# Patient Record
Sex: FEMALE | Race: WHITE | Employment: OTHER | URBAN - METROPOLITAN AREA
[De-identification: names, ages, dates, MRNs, and addresses within clinical notes are randomized per-mention and may not be internally consistent; named-entity substitution may affect disease eponyms.]

---

## 2020-01-01 ENCOUNTER — OFFICE VISIT (OUTPATIENT)
Dept: GYNECOLOGY | Age: 85
End: 2020-01-01
Payer: MEDICARE

## 2020-01-01 VITALS
HEART RATE: 54 BPM | WEIGHT: 108 LBS | BODY MASS INDEX: 21.2 KG/M2 | SYSTOLIC BLOOD PRESSURE: 160 MMHG | DIASTOLIC BLOOD PRESSURE: 94 MMHG | HEIGHT: 60 IN

## 2020-01-01 DIAGNOSIS — N95.0 POSTMENOPAUSAL BLEEDING: Primary | ICD-10-CM

## 2020-01-01 DIAGNOSIS — R93.89 THICKENED ENDOMETRIUM: ICD-10-CM

## 2020-01-01 PROCEDURE — 99204 OFFICE O/P NEW MOD 45 MIN: CPT | Performed by: OBSTETRICS & GYNECOLOGY

## 2020-01-01 RX ORDER — ATORVASTATIN CALCIUM 10 MG/1
10 TABLET, FILM COATED ORAL
COMMUNITY

## 2020-01-01 RX ORDER — ACETAMINOPHEN 325 MG/1
650 TABLET ORAL
COMMUNITY

## 2020-01-01 RX ORDER — DOCUSATE SODIUM 100 MG/1
200 CAPSULE, LIQUID FILLED ORAL
COMMUNITY

## 2020-01-01 RX ORDER — FOLIC ACID 1 MG/1
1 TABLET ORAL DAILY
COMMUNITY

## 2020-01-01 RX ORDER — ALLOPURINOL 100 MG/1
100 TABLET ORAL DAILY
COMMUNITY

## 2020-01-01 RX ORDER — CYANOCOBALAMIN 1000 UG/ML
1000 INJECTION, SOLUTION INTRAMUSCULAR; SUBCUTANEOUS
COMMUNITY
Start: 2020-01-01

## 2020-01-01 RX ORDER — PANTOPRAZOLE SODIUM 20 MG/1
20 TABLET, DELAYED RELEASE ORAL
COMMUNITY

## 2020-01-01 RX ORDER — CETIRIZINE HCL 10 MG
10 TABLET ORAL DAILY
COMMUNITY

## 2020-01-01 RX ORDER — CHOLECALCIFEROL TAB 125 MCG (5000 UNIT) 125 MCG
5000 TAB ORAL
COMMUNITY
End: 2021-01-01 | Stop reason: DRUGHIGH

## 2020-01-01 RX ORDER — FUROSEMIDE 20 MG/1
20 TABLET ORAL DAILY
COMMUNITY

## 2020-10-15 NOTE — PROGRESS NOTES
[address], [address] [place] [id number] [facility] 
P [phone]  F [phone] Office Note Patient ID: 
Name:  Troy Farrar MRN:  [id number] :   y.o. Date:  10/15/2020 HISTORY OF PRESENT ILLNESS: 
Troy Farrar is a 80 y.o.  postmenopausal female who is being seen for postmenopausal bleeding. She is referred by Dr. Arya Willard in [facility]. A pelvic ultrasound demonstrates a 22 mm endometrial stripe. The uterus was small and otherwise normal.  The ovaries were not visualized. She has had prior endometrial biopsies which have been negative. The last one was in 2019. She also has a history of lichen sclerosus of the vulva. I have been asked to see her in consultation for further evaluation and management. She is a bit confused and is a very poor historian. She has a friend with her from which most of the history was obtained. The bleeding is minimal.    
  
 
ROS: 
 and GI review:  Negative Cardiopulmonary review:  Negative Musculoskeletal:  Negative A comprehensive review of systems was negative except for that written in the History of Present Illness. , 10 point ROS 
 
 
OB/GYN ROS: 
 Patient denies any abnormal bleeding or vaginal discharge. Problem List: 
There are no active problems to display for this patient. PMH: 
Past Medical History:  
Diagnosis Date  
 HTN (hypertension)  Hyperlipidemia PSH: 
History reviewed. No pertinent surgical history. Social History: 
Social History Tobacco Use  Smoking status: Never Smoker  Smokeless tobacco: Never Used Substance Use Topics  Alcohol use: Not on file Family History: 
History reviewed. No pertinent family history. Medications: (reviewed) Current Outpatient Medications Medication Sig  
 allopurinoL (ZYLOPRIM) 100 mg tablet Take 100 mg by mouth daily.  atorvastatin (LIPITOR) 10 mg tablet Take 10 mg by mouth daily.  cetirizine (ZYRTEC) 10 mg tablet Take 10 mg by mouth daily.  cholecalciferol (VITAMIN D3) (5000 Units/125 mcg) tab tablet Take 5,000 Units by mouth.  cyanocobalamin (VITAMIN B12) 1,000 mcg/mL injection  folic acid (FOLVITE) 1 mg tablet Take 1 mg by mouth daily.  furosemide (LASIX) 20 mg tablet Take 20 mg by mouth two (2) times a day.  pantoprazole (PROTONIX) 20 mg tablet Take 20 mg by mouth.  acetaminophen (TYLENOL) 325 mg tablet Take  by mouth every four (4) hours as needed for Pain.  docusate sodium (COLACE) 100 mg capsule Take 100 mg by mouth two (2) times a day. No current facility-administered medications for this visit. Allergies: (reviewed) Allergies Allergen Reactions  Nsaids (Non-Steroidal Anti-Inflammatory Drug) Unknown (comments)  Penicillins Unknown (comments)  Sulfa (Sulfonamide Antibiotics) Unknown (comments)  Sulfasalazine Unknown (comments) OBJECTIVE: 
 
Physical Exam: VITAL SIGNS: Vitals:  
 10/15/20 1458 BP: (!) 160/94 Pulse: (!) 54 Weight: 108 lb (49 kg) Height: 5' (1.524 m) Body mass index is 21.09 kg/m². GENERAL JUSTINA: Conversant, alert, oriented. No acute distress. HEENT: HEENT. No thyroid enlargement. No JVD. Neck: Supple without restrictions. RESPIRATORY: Clear to auscultation and percussion to the bases. No CVAT. CARDIOVASC: RRR without murmur/rub. GASTROINT: soft, non-tender; large ventral hernia MUSCULOSKEL: no joint tenderness, deformity or swelling EXTREMITIES: extremities normal, atraumatic, no cyanosis or edema PELVIC: Deferred RECTAL: Deferred YADIRA SURVEY: No suspicious lymphadenopathy or edema noted. NEURO: Grossly intact. No acute deficit. Lab Data: 
 
No results found for: WBC, HGB, HCT, PLT, MCV, HGBEXT, HCTEXT, PLTEXT, HGBEXT, HCTEXT, PLTEXT No results found for: NA, K, CL, CO2, AGAP, GLU, BUN, CREA, BUCR, GFRAA, [address] [place] [address] Imaging: Outside pelvic ultrasound from Dr. Luke Gaitan office reviewed. Pertinent findings noted in HPI. IMPRESSION/PLAN: 
Ranjan Hung is a 80 y.o. female with a working diagnosis of postmenopausal bleeding and a thickened endometrium. I reviewed with Ranjan Hung her medical records, physical exam, and review of symptoms. I recommended a hysteroscopy/D&C to evaluate. This would adequately evaluate for the presence of malignancy and if the symptoms are due to a polyp it could be therapeutic. She was counseled on the risks, benefits, indications, and alternatives of surgery. Her questions were answered and she wishes to proceed.  
 
 
 
Signed By: Shannan Medina MD   
 10/15/2020/12:10 PM

## 2021-01-01 ENCOUNTER — HOSPITAL ENCOUNTER (INPATIENT)
Age: 86
LOS: 3 days | DRG: 951 | End: 2021-01-28
Attending: FAMILY MEDICINE | Admitting: FAMILY MEDICINE
Payer: OTHER MISCELLANEOUS

## 2021-01-01 ENCOUNTER — APPOINTMENT (OUTPATIENT)
Dept: CT IMAGING | Age: 86
DRG: 871 | End: 2021-01-01
Attending: STUDENT IN AN ORGANIZED HEALTH CARE EDUCATION/TRAINING PROGRAM
Payer: MEDICARE

## 2021-01-01 ENCOUNTER — HOSPICE ADMISSION (OUTPATIENT)
Dept: HOSPICE | Facility: HOSPICE | Age: 86
End: 2021-01-01
Payer: MEDICARE

## 2021-01-01 ENCOUNTER — HOSPITAL ENCOUNTER (INPATIENT)
Age: 86
LOS: 6 days | Discharge: HOSPICE | DRG: 871 | End: 2021-01-25
Attending: STUDENT IN AN ORGANIZED HEALTH CARE EDUCATION/TRAINING PROGRAM | Admitting: HOSPITALIST
Payer: MEDICARE

## 2021-01-01 ENCOUNTER — APPOINTMENT (OUTPATIENT)
Dept: GENERAL RADIOLOGY | Age: 86
DRG: 871 | End: 2021-01-01
Attending: STUDENT IN AN ORGANIZED HEALTH CARE EDUCATION/TRAINING PROGRAM
Payer: MEDICARE

## 2021-01-01 VITALS
SYSTOLIC BLOOD PRESSURE: 112 MMHG | HEIGHT: 60 IN | HEART RATE: 58 BPM | OXYGEN SATURATION: 97 % | RESPIRATION RATE: 18 BRPM | WEIGHT: 123.24 LBS | BODY MASS INDEX: 24.19 KG/M2 | TEMPERATURE: 97.1 F | DIASTOLIC BLOOD PRESSURE: 57 MMHG

## 2021-01-01 VITALS
TEMPERATURE: 100.8 F | DIASTOLIC BLOOD PRESSURE: 35 MMHG | SYSTOLIC BLOOD PRESSURE: 57 MMHG | OXYGEN SATURATION: 90 % | HEART RATE: 108 BPM | RESPIRATION RATE: 24 BRPM

## 2021-01-01 DIAGNOSIS — G93.41 METABOLIC ENCEPHALOPATHY: ICD-10-CM

## 2021-01-01 DIAGNOSIS — R45.1 RESTLESSNESS AND AGITATION: ICD-10-CM

## 2021-01-01 DIAGNOSIS — J96.01 ACUTE RESPIRATORY FAILURE WITH HYPOXIA (HCC): ICD-10-CM

## 2021-01-01 DIAGNOSIS — U07.1 PNEUMONIA DUE TO COVID-19 VIRUS: ICD-10-CM

## 2021-01-01 DIAGNOSIS — A41.9 SEPSIS WITHOUT ACUTE ORGAN DYSFUNCTION, DUE TO UNSPECIFIED ORGANISM (HCC): ICD-10-CM

## 2021-01-01 DIAGNOSIS — R13.12 OROPHARYNGEAL DYSPHAGIA: ICD-10-CM

## 2021-01-01 DIAGNOSIS — J12.82 PNEUMONIA DUE TO COVID-19 VIRUS: ICD-10-CM

## 2021-01-01 DIAGNOSIS — Z51.5 HOSPICE CARE PATIENT: ICD-10-CM

## 2021-01-01 DIAGNOSIS — R41.82 ALTERED MENTAL STATUS, UNSPECIFIED ALTERED MENTAL STATUS TYPE: ICD-10-CM

## 2021-01-01 DIAGNOSIS — J12.82 PNEUMONIA DUE TO COVID-19 VIRUS: Primary | ICD-10-CM

## 2021-01-01 DIAGNOSIS — U07.1 PNEUMONIA DUE TO COVID-19 VIRUS: Primary | ICD-10-CM

## 2021-01-01 DIAGNOSIS — E87.6 HYPOKALEMIA: ICD-10-CM

## 2021-01-01 DIAGNOSIS — Z71.89 COUNSELING REGARDING ADVANCE CARE PLANNING AND GOALS OF CARE: ICD-10-CM

## 2021-01-01 DIAGNOSIS — R06.02 SHORTNESS OF BREATH: ICD-10-CM

## 2021-01-01 LAB
ALBUMIN SERPL-MCNC: 2.2 G/DL (ref 3.5–5)
ALBUMIN SERPL-MCNC: 2.4 G/DL (ref 3.5–5)
ALBUMIN SERPL-MCNC: 2.4 G/DL (ref 3.5–5)
ALBUMIN SERPL-MCNC: 2.5 G/DL (ref 3.5–5)
ALBUMIN SERPL-MCNC: 2.7 G/DL (ref 3.5–5)
ALBUMIN/GLOB SERPL: 0.6 {RATIO} (ref 1.1–2.2)
ALBUMIN/GLOB SERPL: 0.7 {RATIO} (ref 1.1–2.2)
ALP SERPL-CCNC: 102 U/L (ref 45–117)
ALP SERPL-CCNC: 104 U/L (ref 45–117)
ALP SERPL-CCNC: 109 U/L (ref 45–117)
ALP SERPL-CCNC: 121 U/L (ref 45–117)
ALP SERPL-CCNC: 94 U/L (ref 45–117)
ALT SERPL-CCNC: 13 U/L (ref 12–78)
ALT SERPL-CCNC: 15 U/L (ref 12–78)
ALT SERPL-CCNC: 15 U/L (ref 12–78)
ALT SERPL-CCNC: 18 U/L (ref 12–78)
ALT SERPL-CCNC: 26 U/L (ref 12–78)
ANION GAP SERPL CALC-SCNC: 5 MMOL/L (ref 5–15)
ANION GAP SERPL CALC-SCNC: 6 MMOL/L (ref 5–15)
ANION GAP SERPL CALC-SCNC: 7 MMOL/L (ref 5–15)
ANION GAP SERPL CALC-SCNC: 7 MMOL/L (ref 5–15)
APPEARANCE UR: CLEAR
ARTERIAL PATENCY WRIST A: ABNORMAL
AST SERPL-CCNC: 27 U/L (ref 15–37)
AST SERPL-CCNC: 29 U/L (ref 15–37)
AST SERPL-CCNC: 30 U/L (ref 15–37)
AST SERPL-CCNC: 33 U/L (ref 15–37)
AST SERPL-CCNC: 44 U/L (ref 15–37)
ATRIAL RATE: 85 BPM
BACTERIA SPEC CULT: ABNORMAL
BACTERIA SPEC CULT: ABNORMAL
BACTERIA SPEC CULT: NORMAL
BACTERIA URNS QL MICRO: NEGATIVE /HPF
BASE EXCESS BLD CALC-SCNC: 2 MMOL/L
BASOPHILS # BLD: 0 K/UL (ref 0–0.1)
BASOPHILS NFR BLD: 0 % (ref 0–1)
BDY SITE: ABNORMAL
BILIRUB SERPL-MCNC: 0.6 MG/DL (ref 0.2–1)
BILIRUB SERPL-MCNC: 0.7 MG/DL (ref 0.2–1)
BILIRUB SERPL-MCNC: 0.7 MG/DL (ref 0.2–1)
BILIRUB SERPL-MCNC: 0.8 MG/DL (ref 0.2–1)
BILIRUB SERPL-MCNC: 0.8 MG/DL (ref 0.2–1)
BILIRUB UR QL: NEGATIVE
BUN SERPL-MCNC: 24 MG/DL (ref 6–20)
BUN SERPL-MCNC: 25 MG/DL (ref 6–20)
BUN SERPL-MCNC: 25 MG/DL (ref 6–20)
BUN SERPL-MCNC: 26 MG/DL (ref 6–20)
BUN SERPL-MCNC: 27 MG/DL (ref 6–20)
BUN SERPL-MCNC: 28 MG/DL (ref 6–20)
BUN SERPL-MCNC: 31 MG/DL (ref 6–20)
BUN SERPL-MCNC: 36 MG/DL (ref 6–20)
BUN/CREAT SERPL: 19 (ref 12–20)
BUN/CREAT SERPL: 20 (ref 12–20)
BUN/CREAT SERPL: 21 (ref 12–20)
BUN/CREAT SERPL: 22 (ref 12–20)
BUN/CREAT SERPL: 23 (ref 12–20)
BUN/CREAT SERPL: 28 (ref 12–20)
BUN/CREAT SERPL: 30 (ref 12–20)
BUN/CREAT SERPL: 32 (ref 12–20)
CA-I BLD-SCNC: 1.29 MMOL/L (ref 1.12–1.32)
CALCIUM SERPL-MCNC: 9 MG/DL (ref 8.5–10.1)
CALCIUM SERPL-MCNC: 9.1 MG/DL (ref 8.5–10.1)
CALCIUM SERPL-MCNC: 9.2 MG/DL (ref 8.5–10.1)
CALCIUM SERPL-MCNC: 9.2 MG/DL (ref 8.5–10.1)
CALCIUM SERPL-MCNC: 9.3 MG/DL (ref 8.5–10.1)
CALCIUM SERPL-MCNC: 9.6 MG/DL (ref 8.5–10.1)
CALCULATED P AXIS, ECG09: 52 DEGREES
CALCULATED R AXIS, ECG10: -12 DEGREES
CALCULATED R AXIS, ECG10: -21 DEGREES
CALCULATED R AXIS, ECG10: -60 DEGREES
CALCULATED T AXIS, ECG11: 104 DEGREES
CALCULATED T AXIS, ECG11: 117 DEGREES
CALCULATED T AXIS, ECG11: 89 DEGREES
CHLORIDE SERPL-SCNC: 110 MMOL/L (ref 97–108)
CHLORIDE SERPL-SCNC: 111 MMOL/L (ref 97–108)
CHLORIDE SERPL-SCNC: 112 MMOL/L (ref 97–108)
CHLORIDE SERPL-SCNC: 113 MMOL/L (ref 97–108)
CHLORIDE SERPL-SCNC: 115 MMOL/L (ref 97–108)
CHLORIDE SERPL-SCNC: 117 MMOL/L (ref 97–108)
CHLORIDE SERPL-SCNC: 118 MMOL/L (ref 97–108)
CHLORIDE SERPL-SCNC: 121 MMOL/L (ref 97–108)
CO2 SERPL-SCNC: 18 MMOL/L (ref 21–32)
CO2 SERPL-SCNC: 18 MMOL/L (ref 21–32)
CO2 SERPL-SCNC: 19 MMOL/L (ref 21–32)
CO2 SERPL-SCNC: 20 MMOL/L (ref 21–32)
CO2 SERPL-SCNC: 24 MMOL/L (ref 21–32)
CO2 SERPL-SCNC: 26 MMOL/L (ref 21–32)
CO2 SERPL-SCNC: 28 MMOL/L (ref 21–32)
CO2 SERPL-SCNC: 31 MMOL/L (ref 21–32)
COLOR UR: ABNORMAL
COMMENT, HOLDF: NORMAL
COVID-19 RAPID TEST, COVR: DETECTED
CREAT SERPL-MCNC: 0.98 MG/DL (ref 0.55–1.02)
CREAT SERPL-MCNC: 1 MG/DL (ref 0.55–1.02)
CREAT SERPL-MCNC: 1.15 MG/DL (ref 0.55–1.02)
CREAT SERPL-MCNC: 1.19 MG/DL (ref 0.55–1.02)
CREAT SERPL-MCNC: 1.21 MG/DL (ref 0.55–1.02)
CREAT SERPL-MCNC: 1.21 MG/DL (ref 0.55–1.02)
CREAT SERPL-MCNC: 1.22 MG/DL (ref 0.55–1.02)
CREAT SERPL-MCNC: 1.34 MG/DL (ref 0.55–1.02)
CRP SERPL-MCNC: 2.88 MG/DL (ref 0–0.6)
D DIMER PPP FEU-MCNC: 11.24 MG/L FEU (ref 0–0.65)
D DIMER PPP FEU-MCNC: 4.1 MG/L FEU (ref 0–0.65)
D DIMER PPP FEU-MCNC: 4.4 MG/L FEU (ref 0–0.65)
D DIMER PPP FEU-MCNC: 8.09 MG/L FEU (ref 0–0.65)
D DIMER PPP FEU-MCNC: 9.88 MG/L FEU (ref 0–0.65)
DATE LAST DOSE: ABNORMAL
DIAGNOSIS, 93000: NORMAL
DIFFERENTIAL METHOD BLD: ABNORMAL
EOSINOPHIL # BLD: 0 K/UL (ref 0–0.4)
EOSINOPHIL NFR BLD: 0 % (ref 0–7)
EPITH CASTS URNS QL MICRO: ABNORMAL /LPF
ERYTHROCYTE [DISTWIDTH] IN BLOOD BY AUTOMATED COUNT: 13.2 % (ref 11.5–14.5)
ERYTHROCYTE [DISTWIDTH] IN BLOOD BY AUTOMATED COUNT: 13.2 % (ref 11.5–14.5)
ERYTHROCYTE [DISTWIDTH] IN BLOOD BY AUTOMATED COUNT: 13.4 % (ref 11.5–14.5)
ERYTHROCYTE [DISTWIDTH] IN BLOOD BY AUTOMATED COUNT: 13.4 % (ref 11.5–14.5)
ERYTHROCYTE [DISTWIDTH] IN BLOOD BY AUTOMATED COUNT: 13.6 % (ref 11.5–14.5)
ERYTHROCYTE [DISTWIDTH] IN BLOOD BY AUTOMATED COUNT: 14 % (ref 11.5–14.5)
ERYTHROCYTE [DISTWIDTH] IN BLOOD BY AUTOMATED COUNT: 14.4 % (ref 11.5–14.5)
FERRITIN SERPL-MCNC: 122 NG/ML (ref 26–388)
FERRITIN SERPL-MCNC: 147 NG/ML (ref 8–252)
FIBRINOGEN PPP-MCNC: 260 MG/DL (ref 200–475)
FIBRINOGEN PPP-MCNC: 281 MG/DL (ref 200–475)
FIBRINOGEN PPP-MCNC: 291 MG/DL (ref 200–475)
GAS FLOW.O2 O2 DELIVERY SYS: ABNORMAL L/MIN
GLOBULIN SER CALC-MCNC: 3.7 G/DL (ref 2–4)
GLOBULIN SER CALC-MCNC: 3.8 G/DL (ref 2–4)
GLOBULIN SER CALC-MCNC: 3.8 G/DL (ref 2–4)
GLOBULIN SER CALC-MCNC: 3.9 G/DL (ref 2–4)
GLOBULIN SER CALC-MCNC: 4.5 G/DL (ref 2–4)
GLUCOSE BLD STRIP.AUTO-MCNC: 103 MG/DL (ref 65–100)
GLUCOSE BLD STRIP.AUTO-MCNC: 113 MG/DL (ref 65–100)
GLUCOSE BLD STRIP.AUTO-MCNC: 114 MG/DL (ref 65–100)
GLUCOSE BLD STRIP.AUTO-MCNC: 116 MG/DL (ref 65–100)
GLUCOSE BLD STRIP.AUTO-MCNC: 118 MG/DL (ref 65–100)
GLUCOSE BLD STRIP.AUTO-MCNC: 118 MG/DL (ref 65–100)
GLUCOSE BLD STRIP.AUTO-MCNC: 120 MG/DL (ref 65–100)
GLUCOSE BLD STRIP.AUTO-MCNC: 121 MG/DL (ref 65–100)
GLUCOSE BLD STRIP.AUTO-MCNC: 124 MG/DL (ref 65–100)
GLUCOSE BLD STRIP.AUTO-MCNC: 126 MG/DL (ref 65–100)
GLUCOSE BLD STRIP.AUTO-MCNC: 133 MG/DL (ref 65–100)
GLUCOSE BLD STRIP.AUTO-MCNC: 136 MG/DL (ref 65–100)
GLUCOSE BLD STRIP.AUTO-MCNC: 139 MG/DL (ref 65–100)
GLUCOSE BLD STRIP.AUTO-MCNC: 141 MG/DL (ref 65–100)
GLUCOSE BLD STRIP.AUTO-MCNC: 146 MG/DL (ref 65–100)
GLUCOSE BLD STRIP.AUTO-MCNC: 150 MG/DL (ref 65–100)
GLUCOSE BLD STRIP.AUTO-MCNC: 152 MG/DL (ref 65–100)
GLUCOSE BLD STRIP.AUTO-MCNC: 154 MG/DL (ref 65–100)
GLUCOSE BLD STRIP.AUTO-MCNC: 159 MG/DL (ref 65–100)
GLUCOSE BLD STRIP.AUTO-MCNC: 163 MG/DL (ref 65–100)
GLUCOSE BLD STRIP.AUTO-MCNC: 167 MG/DL (ref 65–100)
GLUCOSE BLD STRIP.AUTO-MCNC: 174 MG/DL (ref 65–100)
GLUCOSE BLD STRIP.AUTO-MCNC: 182 MG/DL (ref 65–100)
GLUCOSE BLD STRIP.AUTO-MCNC: 189 MG/DL (ref 65–100)
GLUCOSE BLD STRIP.AUTO-MCNC: 207 MG/DL (ref 65–100)
GLUCOSE BLD STRIP.AUTO-MCNC: 94 MG/DL (ref 65–100)
GLUCOSE SERPL-MCNC: 101 MG/DL (ref 65–100)
GLUCOSE SERPL-MCNC: 118 MG/DL (ref 65–100)
GLUCOSE SERPL-MCNC: 131 MG/DL (ref 65–100)
GLUCOSE SERPL-MCNC: 137 MG/DL (ref 65–100)
GLUCOSE SERPL-MCNC: 138 MG/DL (ref 65–100)
GLUCOSE SERPL-MCNC: 149 MG/DL (ref 65–100)
GLUCOSE SERPL-MCNC: 153 MG/DL (ref 65–100)
GLUCOSE SERPL-MCNC: 177 MG/DL (ref 65–100)
GLUCOSE UR STRIP.AUTO-MCNC: NEGATIVE MG/DL
HCO3 BLD-SCNC: 26.5 MMOL/L (ref 22–26)
HCT VFR BLD AUTO: 36.8 % (ref 35–47)
HCT VFR BLD AUTO: 38.2 % (ref 35–47)
HCT VFR BLD AUTO: 38.4 % (ref 35–47)
HCT VFR BLD AUTO: 40.2 % (ref 35–47)
HCT VFR BLD AUTO: 40.4 % (ref 35–47)
HCT VFR BLD AUTO: 42.2 % (ref 35–47)
HCT VFR BLD AUTO: 45.2 % (ref 35–47)
HEALTH STATUS, XMCV2T: ABNORMAL
HGB BLD-MCNC: 11.6 G/DL (ref 11.5–16)
HGB BLD-MCNC: 11.9 G/DL (ref 11.5–16)
HGB BLD-MCNC: 12 G/DL (ref 11.5–16)
HGB BLD-MCNC: 12.6 G/DL (ref 11.5–16)
HGB BLD-MCNC: 12.8 G/DL (ref 11.5–16)
HGB BLD-MCNC: 13.5 G/DL (ref 11.5–16)
HGB BLD-MCNC: 14.1 G/DL (ref 11.5–16)
HGB UR QL STRIP: ABNORMAL
IMM GRANULOCYTES # BLD AUTO: 0.1 K/UL (ref 0–0.04)
IMM GRANULOCYTES # BLD AUTO: 0.2 K/UL (ref 0–0.04)
IMM GRANULOCYTES NFR BLD AUTO: 1 % (ref 0–0.5)
IMM GRANULOCYTES NFR BLD AUTO: 2 % (ref 0–0.5)
INR PPP: 1.1 (ref 0.9–1.1)
INR PPP: 1.1 (ref 0.9–1.1)
INR PPP: 1.2 (ref 0.9–1.1)
INR PPP: 1.2 (ref 0.9–1.1)
INR PPP: 1.3 (ref 0.9–1.1)
INR PPP: 1.4 (ref 0.9–1.1)
INR PPP: 1.5 (ref 0.9–1.1)
KETONES UR QL STRIP.AUTO: ABNORMAL MG/DL
LACTATE BLD-SCNC: 1.77 MMOL/L (ref 0.4–2)
LDH SERPL L TO P-CCNC: 384 U/L (ref 81–246)
LDH SERPL L TO P-CCNC: 433 U/L (ref 81–246)
LEUKOCYTE ESTERASE UR QL STRIP.AUTO: NEGATIVE
LYMPHOCYTES # BLD: 0.5 K/UL (ref 0.8–3.5)
LYMPHOCYTES # BLD: 0.6 K/UL (ref 0.8–3.5)
LYMPHOCYTES # BLD: 1 K/UL (ref 0.8–3.5)
LYMPHOCYTES # BLD: 1.5 K/UL (ref 0.8–3.5)
LYMPHOCYTES NFR BLD: 4 % (ref 12–49)
LYMPHOCYTES NFR BLD: 4 % (ref 12–49)
LYMPHOCYTES NFR BLD: 6 % (ref 12–49)
LYMPHOCYTES NFR BLD: 8 % (ref 12–49)
MAGNESIUM SERPL-MCNC: 2 MG/DL (ref 1.6–2.4)
MAGNESIUM SERPL-MCNC: 2 MG/DL (ref 1.6–2.4)
MAGNESIUM SERPL-MCNC: 2.3 MG/DL (ref 1.6–2.4)
MCH RBC QN AUTO: 28 PG (ref 26–34)
MCH RBC QN AUTO: 28.1 PG (ref 26–34)
MCH RBC QN AUTO: 28.4 PG (ref 26–34)
MCH RBC QN AUTO: 28.5 PG (ref 26–34)
MCH RBC QN AUTO: 28.6 PG (ref 26–34)
MCH RBC QN AUTO: 28.7 PG (ref 26–34)
MCH RBC QN AUTO: 29 PG (ref 26–34)
MCHC RBC AUTO-ENTMCNC: 31.2 G/DL (ref 30–36.5)
MCHC RBC AUTO-ENTMCNC: 31.3 G/DL (ref 30–36.5)
MCHC RBC AUTO-ENTMCNC: 31.5 G/DL (ref 30–36.5)
MCHC RBC AUTO-ENTMCNC: 31.8 G/DL (ref 30–36.5)
MCHC RBC AUTO-ENTMCNC: 32 G/DL (ref 30–36.5)
MCV RBC AUTO: 88.9 FL (ref 80–99)
MCV RBC AUTO: 89.8 FL (ref 80–99)
MCV RBC AUTO: 90 FL (ref 80–99)
MCV RBC AUTO: 91 FL (ref 80–99)
MCV RBC AUTO: 91.1 FL (ref 80–99)
MCV RBC AUTO: 91.4 FL (ref 80–99)
MCV RBC AUTO: 91.4 FL (ref 80–99)
MONOCYTES # BLD: 0.6 K/UL (ref 0–1)
MONOCYTES # BLD: 0.7 K/UL (ref 0–1)
MONOCYTES # BLD: 0.9 K/UL (ref 0–1)
MONOCYTES # BLD: 1 K/UL (ref 0–1)
MONOCYTES NFR BLD: 4 % (ref 5–13)
MONOCYTES NFR BLD: 5 % (ref 5–13)
MONOCYTES NFR BLD: 5 % (ref 5–13)
MONOCYTES NFR BLD: 6 % (ref 5–13)
NEUTS SEG # BLD: 12.1 K/UL (ref 1.8–8)
NEUTS SEG # BLD: 12.2 K/UL (ref 1.8–8)
NEUTS SEG # BLD: 13.8 K/UL (ref 1.8–8)
NEUTS SEG # BLD: 15.2 K/UL (ref 1.8–8)
NEUTS SEG NFR BLD: 86 % (ref 32–75)
NEUTS SEG NFR BLD: 87 % (ref 32–75)
NEUTS SEG NFR BLD: 90 % (ref 32–75)
NEUTS SEG NFR BLD: 90 % (ref 32–75)
NITRITE UR QL STRIP.AUTO: NEGATIVE
NRBC # BLD: 0 K/UL (ref 0–0.01)
NRBC BLD-RTO: 0 PER 100 WBC
P-R INTERVAL, ECG05: 136 MS
PCO2 BLD: 39.3 MMHG (ref 35–45)
PH BLD: 7.44 [PH] (ref 7.35–7.45)
PH UR STRIP: 5.5 [PH] (ref 5–8)
PHOSPHATE SERPL-MCNC: 2.2 MG/DL (ref 2.6–4.7)
PLATELET # BLD AUTO: 230 K/UL (ref 150–400)
PLATELET # BLD AUTO: 233 K/UL (ref 150–400)
PLATELET # BLD AUTO: 249 K/UL (ref 150–400)
PLATELET # BLD AUTO: 264 K/UL (ref 150–400)
PLATELET # BLD AUTO: 288 K/UL (ref 150–400)
PLATELET # BLD AUTO: 302 K/UL (ref 150–400)
PLATELET # BLD AUTO: 339 K/UL (ref 150–400)
PMV BLD AUTO: 10.2 FL (ref 8.9–12.9)
PMV BLD AUTO: 10.7 FL (ref 8.9–12.9)
PMV BLD AUTO: 10.8 FL (ref 8.9–12.9)
PMV BLD AUTO: 10.9 FL (ref 8.9–12.9)
PMV BLD AUTO: 10.9 FL (ref 8.9–12.9)
PO2 BLD: 61 MMHG (ref 80–100)
POTASSIUM SERPL-SCNC: 3.1 MMOL/L (ref 3.5–5.1)
POTASSIUM SERPL-SCNC: 3.1 MMOL/L (ref 3.5–5.1)
POTASSIUM SERPL-SCNC: 3.3 MMOL/L (ref 3.5–5.1)
POTASSIUM SERPL-SCNC: 3.5 MMOL/L (ref 3.5–5.1)
POTASSIUM SERPL-SCNC: 4 MMOL/L (ref 3.5–5.1)
POTASSIUM SERPL-SCNC: 4.6 MMOL/L (ref 3.5–5.1)
POTASSIUM SERPL-SCNC: 5.2 MMOL/L (ref 3.5–5.1)
POTASSIUM SERPL-SCNC: 5.2 MMOL/L (ref 3.5–5.1)
PROCALCITONIN SERPL-MCNC: 0.19 NG/ML
PROCALCITONIN SERPL-MCNC: 0.23 NG/ML
PROT SERPL-MCNC: 6 G/DL (ref 6.4–8.2)
PROT SERPL-MCNC: 6.2 G/DL (ref 6.4–8.2)
PROT SERPL-MCNC: 6.2 G/DL (ref 6.4–8.2)
PROT SERPL-MCNC: 6.3 G/DL (ref 6.4–8.2)
PROT SERPL-MCNC: 7.2 G/DL (ref 6.4–8.2)
PROT UR STRIP-MCNC: NEGATIVE MG/DL
PROTHROMBIN TIME: 11.5 SEC (ref 9–11.1)
PROTHROMBIN TIME: 11.6 SEC (ref 9–11.1)
PROTHROMBIN TIME: 12.1 SEC (ref 9–11.1)
PROTHROMBIN TIME: 12.3 SEC (ref 9–11.1)
PROTHROMBIN TIME: 13.3 SEC (ref 9–11.1)
PROTHROMBIN TIME: 14.3 SEC (ref 9–11.1)
PROTHROMBIN TIME: 15.6 SEC (ref 9–11.1)
Q-T INTERVAL, ECG07: 360 MS
Q-T INTERVAL, ECG07: 362 MS
Q-T INTERVAL, ECG07: 370 MS
QRS DURATION, ECG06: 70 MS
QRS DURATION, ECG06: 74 MS
QRS DURATION, ECG06: 90 MS
QTC CALCULATION (BEZET), ECG08: 412 MS
QTC CALCULATION (BEZET), ECG08: 428 MS
QTC CALCULATION (BEZET), ECG08: 429 MS
RBC # BLD AUTO: 4.14 M/UL (ref 3.8–5.2)
RBC # BLD AUTO: 4.18 M/UL (ref 3.8–5.2)
RBC # BLD AUTO: 4.2 M/UL (ref 3.8–5.2)
RBC # BLD AUTO: 4.42 M/UL (ref 3.8–5.2)
RBC # BLD AUTO: 4.49 M/UL (ref 3.8–5.2)
RBC # BLD AUTO: 4.7 M/UL (ref 3.8–5.2)
RBC # BLD AUTO: 4.96 M/UL (ref 3.8–5.2)
RBC #/AREA URNS HPF: ABNORMAL /HPF (ref 0–5)
RBC MORPH BLD: ABNORMAL
REPORTED DOSE,DOSE: ABNORMAL UNITS
REPORTED DOSE/TIME,TMG: ABNORMAL
SAMPLES BEING HELD,HOLD: NORMAL
SAO2 % BLD: 92 % (ref 92–97)
SERVICE CMNT-IMP: ABNORMAL
SERVICE CMNT-IMP: NORMAL
SERVICE CMNT-IMP: NORMAL
SODIUM SERPL-SCNC: 141 MMOL/L (ref 136–145)
SODIUM SERPL-SCNC: 142 MMOL/L (ref 136–145)
SODIUM SERPL-SCNC: 142 MMOL/L (ref 136–145)
SODIUM SERPL-SCNC: 143 MMOL/L (ref 136–145)
SODIUM SERPL-SCNC: 144 MMOL/L (ref 136–145)
SODIUM SERPL-SCNC: 144 MMOL/L (ref 136–145)
SODIUM SERPL-SCNC: 145 MMOL/L (ref 136–145)
SODIUM SERPL-SCNC: 147 MMOL/L (ref 136–145)
SOURCE, COVRS: ABNORMAL
SP GR UR REFRACTOMETRY: 1.01 (ref 1–1.03)
SPECIMEN SOURCE, FCOV2M: ABNORMAL
SPECIMEN TYPE, XMCV1T: ABNORMAL
SPECIMEN TYPE: ABNORMAL
TOTAL RESP. RATE, ITRR: 16
TROPONIN I SERPL-MCNC: <0.05 NG/ML
UA: UC IF INDICATED,UAUC: ABNORMAL
UROBILINOGEN UR QL STRIP.AUTO: 1 EU/DL (ref 0.2–1)
VANCOMYCIN TROUGH SERPL-MCNC: 10.7 UG/ML (ref 5–10)
VENTRICULAR RATE, ECG03: 78 BPM
VENTRICULAR RATE, ECG03: 81 BPM
VENTRICULAR RATE, ECG03: 85 BPM
WBC # BLD AUTO: 11.2 K/UL (ref 3.6–11)
WBC # BLD AUTO: 11.2 K/UL (ref 3.6–11)
WBC # BLD AUTO: 13.4 K/UL (ref 3.6–11)
WBC # BLD AUTO: 13.5 K/UL (ref 3.6–11)
WBC # BLD AUTO: 16 K/UL (ref 3.6–11)
WBC # BLD AUTO: 16.2 K/UL (ref 3.6–11)
WBC # BLD AUTO: 17.7 K/UL (ref 3.6–11)
WBC URNS QL MICRO: ABNORMAL /HPF (ref 0–4)

## 2021-01-01 PROCEDURE — 94760 N-INVAS EAR/PLS OXIMETRY 1: CPT

## 2021-01-01 PROCEDURE — 65270000029 HC RM PRIVATE

## 2021-01-01 PROCEDURE — 74011000258 HC RX REV CODE- 258: Performed by: NURSE PRACTITIONER

## 2021-01-01 PROCEDURE — 77010033678 HC OXYGEN DAILY

## 2021-01-01 PROCEDURE — 77030019905 HC CATH URETH INTMIT MDII -A

## 2021-01-01 PROCEDURE — 74011250636 HC RX REV CODE- 250/636: Performed by: STUDENT IN AN ORGANIZED HEALTH CARE EDUCATION/TRAINING PROGRAM

## 2021-01-01 PROCEDURE — 85027 COMPLETE CBC AUTOMATED: CPT

## 2021-01-01 PROCEDURE — 74011000258 HC RX REV CODE- 258: Performed by: INTERNAL MEDICINE

## 2021-01-01 PROCEDURE — C9113 INJ PANTOPRAZOLE SODIUM, VIA: HCPCS | Performed by: HOSPITALIST

## 2021-01-01 PROCEDURE — XW033E5 INTRODUCTION OF REMDESIVIR ANTI-INFECTIVE INTO PERIPHERAL VEIN, PERCUTANEOUS APPROACH, NEW TECHNOLOGY GROUP 5: ICD-10-PCS | Performed by: HOSPITALIST

## 2021-01-01 PROCEDURE — 84145 PROCALCITONIN (PCT): CPT

## 2021-01-01 PROCEDURE — 80053 COMPREHEN METABOLIC PANEL: CPT

## 2021-01-01 PROCEDURE — 0656 HSPC GENERAL INPATIENT

## 2021-01-01 PROCEDURE — 82728 ASSAY OF FERRITIN: CPT

## 2021-01-01 PROCEDURE — 74011000250 HC RX REV CODE- 250: Performed by: HOSPITALIST

## 2021-01-01 PROCEDURE — 74011000250 HC RX REV CODE- 250: Performed by: FAMILY MEDICINE

## 2021-01-01 PROCEDURE — 83615 LACTATE (LD) (LDH) ENZYME: CPT

## 2021-01-01 PROCEDURE — 74011250636 HC RX REV CODE- 250/636: Performed by: HOSPITALIST

## 2021-01-01 PROCEDURE — 74011000258 HC RX REV CODE- 258: Performed by: HOSPITALIST

## 2021-01-01 PROCEDURE — 82962 GLUCOSE BLOOD TEST: CPT

## 2021-01-01 PROCEDURE — 99285 EMERGENCY DEPT VISIT HI MDM: CPT

## 2021-01-01 PROCEDURE — 36415 COLL VENOUS BLD VENIPUNCTURE: CPT

## 2021-01-01 PROCEDURE — 87635 SARS-COV-2 COVID-19 AMP PRB: CPT

## 2021-01-01 PROCEDURE — 82803 BLOOD GASES ANY COMBINATION: CPT

## 2021-01-01 PROCEDURE — 65660000000 HC RM CCU STEPDOWN

## 2021-01-01 PROCEDURE — 84100 ASSAY OF PHOSPHORUS: CPT

## 2021-01-01 PROCEDURE — 85025 COMPLETE CBC W/AUTO DIFF WBC: CPT

## 2021-01-01 PROCEDURE — 74011250636 HC RX REV CODE- 250/636: Performed by: FAMILY MEDICINE

## 2021-01-01 PROCEDURE — 85384 FIBRINOGEN ACTIVITY: CPT

## 2021-01-01 PROCEDURE — 36600 WITHDRAWAL OF ARTERIAL BLOOD: CPT

## 2021-01-01 PROCEDURE — 84484 ASSAY OF TROPONIN QUANT: CPT

## 2021-01-01 PROCEDURE — 80202 ASSAY OF VANCOMYCIN: CPT

## 2021-01-01 PROCEDURE — 85379 FIBRIN DEGRADATION QUANT: CPT

## 2021-01-01 PROCEDURE — 85610 PROTHROMBIN TIME: CPT

## 2021-01-01 PROCEDURE — 87040 BLOOD CULTURE FOR BACTERIA: CPT

## 2021-01-01 PROCEDURE — 99223 1ST HOSP IP/OBS HIGH 75: CPT | Performed by: NURSE PRACTITIONER

## 2021-01-01 PROCEDURE — 83735 ASSAY OF MAGNESIUM: CPT

## 2021-01-01 PROCEDURE — 99233 SBSQ HOSP IP/OBS HIGH 50: CPT | Performed by: FAMILY MEDICINE

## 2021-01-01 PROCEDURE — 99232 SBSQ HOSP IP/OBS MODERATE 35: CPT | Performed by: FAMILY MEDICINE

## 2021-01-01 PROCEDURE — 80048 BASIC METABOLIC PNL TOTAL CA: CPT

## 2021-01-01 PROCEDURE — 92610 EVALUATE SWALLOWING FUNCTION: CPT

## 2021-01-01 PROCEDURE — 74011250636 HC RX REV CODE- 250/636: Performed by: INTERNAL MEDICINE

## 2021-01-01 PROCEDURE — 93005 ELECTROCARDIOGRAM TRACING: CPT

## 2021-01-01 PROCEDURE — 71045 X-RAY EXAM CHEST 1 VIEW: CPT

## 2021-01-01 PROCEDURE — 86140 C-REACTIVE PROTEIN: CPT

## 2021-01-01 PROCEDURE — 74011250636 HC RX REV CODE- 250/636: Performed by: NURSE PRACTITIONER

## 2021-01-01 PROCEDURE — 2709999900 HC NON-CHARGEABLE SUPPLY

## 2021-01-01 PROCEDURE — 74011636637 HC RX REV CODE- 636/637: Performed by: HOSPITALIST

## 2021-01-01 PROCEDURE — 74011250637 HC RX REV CODE- 250/637: Performed by: HOSPITALIST

## 2021-01-01 PROCEDURE — 81001 URINALYSIS AUTO W/SCOPE: CPT

## 2021-01-01 PROCEDURE — 70450 CT HEAD/BRAIN W/O DYE: CPT

## 2021-01-01 PROCEDURE — 3336500001 HSPC ELECTION

## 2021-01-01 PROCEDURE — 83605 ASSAY OF LACTIC ACID: CPT

## 2021-01-01 PROCEDURE — 74011000258 HC RX REV CODE- 258: Performed by: STUDENT IN AN ORGANIZED HEALTH CARE EDUCATION/TRAINING PROGRAM

## 2021-01-01 PROCEDURE — 96365 THER/PROPH/DIAG IV INF INIT: CPT

## 2021-01-01 PROCEDURE — 92526 ORAL FUNCTION THERAPY: CPT

## 2021-01-01 PROCEDURE — 99223 1ST HOSP IP/OBS HIGH 75: CPT | Performed by: FAMILY MEDICINE

## 2021-01-01 PROCEDURE — 92526 ORAL FUNCTION THERAPY: CPT | Performed by: SPEECH-LANGUAGE PATHOLOGIST

## 2021-01-01 RX ORDER — SODIUM CHLORIDE 0.9 % (FLUSH) 0.9 %
5-40 SYRINGE (ML) INJECTION EVERY 8 HOURS
Status: DISCONTINUED | OUTPATIENT
Start: 2021-01-01 | End: 2021-01-01 | Stop reason: HOSPADM

## 2021-01-01 RX ORDER — LORAZEPAM 2 MG/ML
0.5 INJECTION INTRAMUSCULAR
Status: DISCONTINUED | OUTPATIENT
Start: 2021-01-01 | End: 2021-01-01

## 2021-01-01 RX ORDER — HYDROMORPHONE HYDROCHLORIDE 1 MG/ML
0.5 INJECTION, SOLUTION INTRAMUSCULAR; INTRAVENOUS; SUBCUTANEOUS EVERY 4 HOURS
Status: DISCONTINUED | OUTPATIENT
Start: 2021-01-01 | End: 2021-01-01

## 2021-01-01 RX ORDER — ACETAMINOPHEN 650 MG/1
650 SUPPOSITORY RECTAL
Status: DISCONTINUED | OUTPATIENT
Start: 2021-01-01 | End: 2021-01-01 | Stop reason: HOSPADM

## 2021-01-01 RX ORDER — ATORVASTATIN CALCIUM 10 MG/1
10 TABLET, FILM COATED ORAL
Status: DISCONTINUED | OUTPATIENT
Start: 2021-01-01 | End: 2021-01-01 | Stop reason: HOSPADM

## 2021-01-01 RX ORDER — ACETAMINOPHEN 325 MG/1
650 TABLET ORAL
Status: DISCONTINUED | OUTPATIENT
Start: 2021-01-01 | End: 2021-01-01 | Stop reason: HOSPADM

## 2021-01-01 RX ORDER — DEXTROSE MONOHYDRATE AND SODIUM CHLORIDE 5; .9 G/100ML; G/100ML
75 INJECTION, SOLUTION INTRAVENOUS CONTINUOUS
Status: DISCONTINUED | OUTPATIENT
Start: 2021-01-01 | End: 2021-01-01 | Stop reason: HOSPADM

## 2021-01-01 RX ORDER — DEXAMETHASONE SODIUM PHOSPHATE 4 MG/ML
6 INJECTION, SOLUTION INTRA-ARTICULAR; INTRALESIONAL; INTRAMUSCULAR; INTRAVENOUS; SOFT TISSUE ONCE
Status: COMPLETED | OUTPATIENT
Start: 2021-01-01 | End: 2021-01-01

## 2021-01-01 RX ORDER — METRONIDAZOLE 500 MG/100ML
500 INJECTION, SOLUTION INTRAVENOUS EVERY 12 HOURS
Status: DISCONTINUED | OUTPATIENT
Start: 2021-01-01 | End: 2021-01-01

## 2021-01-01 RX ORDER — HYDROMORPHONE HYDROCHLORIDE 1 MG/ML
0.5 INJECTION, SOLUTION INTRAMUSCULAR; INTRAVENOUS; SUBCUTANEOUS
Status: DISCONTINUED | OUTPATIENT
Start: 2021-01-01 | End: 2021-01-01

## 2021-01-01 RX ORDER — FOLIC ACID 1 MG/1
1 TABLET ORAL DAILY
Status: DISCONTINUED | OUTPATIENT
Start: 2021-01-01 | End: 2021-01-01 | Stop reason: HOSPADM

## 2021-01-01 RX ORDER — FACIAL-BODY WIPES
10 EACH TOPICAL DAILY PRN
Status: DISCONTINUED | OUTPATIENT
Start: 2021-01-01 | End: 2021-01-01 | Stop reason: HOSPADM

## 2021-01-01 RX ORDER — ASCORBIC ACID 500 MG
500 TABLET ORAL DAILY
Status: DISCONTINUED | OUTPATIENT
Start: 2021-01-01 | End: 2021-01-01 | Stop reason: HOSPADM

## 2021-01-01 RX ORDER — POLYETHYLENE GLYCOL 3350 17 G/17G
17 POWDER, FOR SOLUTION ORAL DAILY PRN
Status: DISCONTINUED | OUTPATIENT
Start: 2021-01-01 | End: 2021-01-01 | Stop reason: HOSPADM

## 2021-01-01 RX ORDER — DOCUSATE SODIUM 100 MG/1
100 CAPSULE, LIQUID FILLED ORAL 2 TIMES DAILY
Status: DISCONTINUED | OUTPATIENT
Start: 2021-01-01 | End: 2021-01-01 | Stop reason: HOSPADM

## 2021-01-01 RX ORDER — LORAZEPAM 2 MG/ML
1 INJECTION INTRAMUSCULAR EVERY 4 HOURS
Status: DISCONTINUED | OUTPATIENT
Start: 2021-01-01 | End: 2021-01-01 | Stop reason: HOSPADM

## 2021-01-01 RX ORDER — HEPARIN SODIUM 5000 [USP'U]/ML
5000 INJECTION, SOLUTION INTRAVENOUS; SUBCUTANEOUS EVERY 8 HOURS
Status: DISCONTINUED | OUTPATIENT
Start: 2021-01-01 | End: 2021-01-01

## 2021-01-01 RX ORDER — SODIUM CHLORIDE 0.9 % (FLUSH) 0.9 %
5 SYRINGE (ML) INJECTION AS NEEDED
Status: DISCONTINUED | OUTPATIENT
Start: 2021-01-01 | End: 2021-01-01 | Stop reason: HOSPADM

## 2021-01-01 RX ORDER — DEXTROSE MONOHYDRATE AND SODIUM CHLORIDE 5; .45 G/100ML; G/100ML
100 INJECTION, SOLUTION INTRAVENOUS CONTINUOUS
Status: DISCONTINUED | OUTPATIENT
Start: 2021-01-01 | End: 2021-01-01

## 2021-01-01 RX ORDER — KETOROLAC TROMETHAMINE 30 MG/ML
15 INJECTION, SOLUTION INTRAMUSCULAR; INTRAVENOUS
Status: ACTIVE | OUTPATIENT
Start: 2021-01-01 | End: 2021-01-01

## 2021-01-01 RX ORDER — ONDANSETRON 2 MG/ML
4 INJECTION INTRAMUSCULAR; INTRAVENOUS
Status: DISCONTINUED | OUTPATIENT
Start: 2021-01-01 | End: 2021-01-01 | Stop reason: HOSPADM

## 2021-01-01 RX ORDER — ZINC SULFATE 50(220)MG
1 CAPSULE ORAL DAILY
Status: DISCONTINUED | OUTPATIENT
Start: 2021-01-01 | End: 2021-01-01 | Stop reason: HOSPADM

## 2021-01-01 RX ORDER — PROMETHAZINE HYDROCHLORIDE 25 MG/1
12.5 TABLET ORAL
Status: DISCONTINUED | OUTPATIENT
Start: 2021-01-01 | End: 2021-01-01 | Stop reason: HOSPADM

## 2021-01-01 RX ORDER — GLYCOPYRROLATE 0.2 MG/ML
0.2 INJECTION INTRAMUSCULAR; INTRAVENOUS EVERY 4 HOURS
Status: DISCONTINUED | OUTPATIENT
Start: 2021-01-01 | End: 2021-01-01 | Stop reason: HOSPADM

## 2021-01-01 RX ORDER — MELATONIN
1000 DAILY
COMMUNITY

## 2021-01-01 RX ORDER — KETOROLAC TROMETHAMINE 30 MG/ML
15 INJECTION, SOLUTION INTRAMUSCULAR; INTRAVENOUS
Status: DISCONTINUED | OUTPATIENT
Start: 2021-01-01 | End: 2021-01-01 | Stop reason: HOSPADM

## 2021-01-01 RX ORDER — ENOXAPARIN SODIUM 100 MG/ML
30 INJECTION SUBCUTANEOUS EVERY 12 HOURS
Status: DISCONTINUED | OUTPATIENT
Start: 2021-01-01 | End: 2021-01-01 | Stop reason: DRUGHIGH

## 2021-01-01 RX ORDER — INSULIN LISPRO 100 [IU]/ML
INJECTION, SOLUTION INTRAVENOUS; SUBCUTANEOUS EVERY 6 HOURS
Status: DISCONTINUED | OUTPATIENT
Start: 2021-01-01 | End: 2021-01-01 | Stop reason: HOSPADM

## 2021-01-01 RX ORDER — HYDROMORPHONE HYDROCHLORIDE 1 MG/ML
1 INJECTION, SOLUTION INTRAMUSCULAR; INTRAVENOUS; SUBCUTANEOUS
Status: DISCONTINUED | OUTPATIENT
Start: 2021-01-01 | End: 2021-01-01 | Stop reason: HOSPADM

## 2021-01-01 RX ORDER — HEPARIN SODIUM 5000 [USP'U]/ML
5000 INJECTION, SOLUTION INTRAVENOUS; SUBCUTANEOUS EVERY 12 HOURS
Status: DISCONTINUED | OUTPATIENT
Start: 2021-01-01 | End: 2021-01-01

## 2021-01-01 RX ORDER — POTASSIUM CHLORIDE 7.45 MG/ML
10 INJECTION INTRAVENOUS ONCE
Status: COMPLETED | OUTPATIENT
Start: 2021-01-01 | End: 2021-01-01

## 2021-01-01 RX ORDER — ENOXAPARIN SODIUM 100 MG/ML
30 INJECTION SUBCUTANEOUS EVERY 24 HOURS
Status: DISCONTINUED | OUTPATIENT
Start: 2021-01-01 | End: 2021-01-01 | Stop reason: HOSPADM

## 2021-01-01 RX ORDER — ALLOPURINOL 100 MG/1
100 TABLET ORAL DAILY
Status: DISCONTINUED | OUTPATIENT
Start: 2021-01-01 | End: 2021-01-01 | Stop reason: HOSPADM

## 2021-01-01 RX ORDER — LORAZEPAM 2 MG/ML
0.5 INJECTION INTRAMUSCULAR EVERY 4 HOURS
Status: DISCONTINUED | OUTPATIENT
Start: 2021-01-01 | End: 2021-01-01

## 2021-01-01 RX ORDER — SODIUM CHLORIDE 0.9 % (FLUSH) 0.9 %
5-10 SYRINGE (ML) INJECTION AS NEEDED
Status: DISCONTINUED | OUTPATIENT
Start: 2021-01-01 | End: 2021-01-01 | Stop reason: SDUPTHER

## 2021-01-01 RX ORDER — MAGNESIUM SULFATE 100 %
4 CRYSTALS MISCELLANEOUS AS NEEDED
Status: DISCONTINUED | OUTPATIENT
Start: 2021-01-01 | End: 2021-01-01 | Stop reason: HOSPADM

## 2021-01-01 RX ORDER — POTASSIUM CHLORIDE 7.45 MG/ML
10 INJECTION INTRAVENOUS
Status: DISPENSED | OUTPATIENT
Start: 2021-01-01 | End: 2021-01-01

## 2021-01-01 RX ORDER — PANTOPRAZOLE SODIUM 40 MG/1
40 TABLET, DELAYED RELEASE ORAL
Status: DISCONTINUED | OUTPATIENT
Start: 2021-01-01 | End: 2021-01-01 | Stop reason: HOSPADM

## 2021-01-01 RX ORDER — GUAIFENESIN/DEXTROMETHORPHAN 100-10MG/5
5 SYRUP ORAL
Status: DISCONTINUED | OUTPATIENT
Start: 2021-01-01 | End: 2021-01-01 | Stop reason: HOSPADM

## 2021-01-01 RX ORDER — SODIUM CHLORIDE 0.9 % (FLUSH) 0.9 %
5-40 SYRINGE (ML) INJECTION AS NEEDED
Status: DISCONTINUED | OUTPATIENT
Start: 2021-01-01 | End: 2021-01-01 | Stop reason: HOSPADM

## 2021-01-01 RX ORDER — DEXAMETHASONE SODIUM PHOSPHATE 4 MG/ML
6 INJECTION, SOLUTION INTRA-ARTICULAR; INTRALESIONAL; INTRAMUSCULAR; INTRAVENOUS; SOFT TISSUE DAILY
Status: DISCONTINUED | OUTPATIENT
Start: 2021-01-01 | End: 2021-01-01 | Stop reason: HOSPADM

## 2021-01-01 RX ORDER — MELATONIN
2000 DAILY
Status: DISCONTINUED | OUTPATIENT
Start: 2021-01-01 | End: 2021-01-01 | Stop reason: HOSPADM

## 2021-01-01 RX ORDER — HYDRALAZINE HYDROCHLORIDE 20 MG/ML
10 INJECTION INTRAMUSCULAR; INTRAVENOUS
Status: DISCONTINUED | OUTPATIENT
Start: 2021-01-01 | End: 2021-01-01 | Stop reason: HOSPADM

## 2021-01-01 RX ORDER — ASCORBIC ACID 500 MG
500 TABLET ORAL DAILY
COMMUNITY
Start: 2021-01-01 | End: 2021-01-29

## 2021-01-01 RX ORDER — HYDROMORPHONE HYDROCHLORIDE 1 MG/ML
1 INJECTION, SOLUTION INTRAMUSCULAR; INTRAVENOUS; SUBCUTANEOUS EVERY 4 HOURS
Status: DISCONTINUED | OUTPATIENT
Start: 2021-01-01 | End: 2021-01-01 | Stop reason: HOSPADM

## 2021-01-01 RX ORDER — DEXTROSE, SODIUM CHLORIDE, AND POTASSIUM CHLORIDE 5; .45; .3 G/100ML; G/100ML; G/100ML
INJECTION INTRAVENOUS CONTINUOUS
Status: DISCONTINUED | OUTPATIENT
Start: 2021-01-01 | End: 2021-01-01

## 2021-01-01 RX ORDER — LORAZEPAM 2 MG/ML
1 INJECTION INTRAMUSCULAR
Status: DISCONTINUED | OUTPATIENT
Start: 2021-01-01 | End: 2021-01-01 | Stop reason: HOSPADM

## 2021-01-01 RX ORDER — DEXTROSE 50 % IN WATER (D50W) INTRAVENOUS SYRINGE
12.5-25 AS NEEDED
Status: DISCONTINUED | OUTPATIENT
Start: 2021-01-01 | End: 2021-01-01 | Stop reason: HOSPADM

## 2021-01-01 RX ORDER — HYDROMORPHONE HYDROCHLORIDE 1 MG/ML
0.2 INJECTION, SOLUTION INTRAMUSCULAR; INTRAVENOUS; SUBCUTANEOUS
Status: DISCONTINUED | OUTPATIENT
Start: 2021-01-01 | End: 2021-01-01 | Stop reason: HOSPADM

## 2021-01-01 RX ORDER — LORAZEPAM 2 MG/ML
0.5 INJECTION INTRAMUSCULAR
Status: DISCONTINUED | OUTPATIENT
Start: 2021-01-01 | End: 2021-01-01 | Stop reason: HOSPADM

## 2021-01-01 RX ORDER — METRONIDAZOLE 500 MG/100ML
500 INJECTION, SOLUTION INTRAVENOUS EVERY 12 HOURS
Status: DISCONTINUED | OUTPATIENT
Start: 2021-01-01 | End: 2021-01-01 | Stop reason: HOSPADM

## 2021-01-01 RX ORDER — GLYCOPYRROLATE 0.2 MG/ML
0.2 INJECTION INTRAMUSCULAR; INTRAVENOUS
Status: DISCONTINUED | OUTPATIENT
Start: 2021-01-01 | End: 2021-01-01

## 2021-01-01 RX ADMIN — METRONIDAZOLE 500 MG: 500 INJECTION, SOLUTION INTRAVENOUS at 09:56

## 2021-01-01 RX ADMIN — VANCOMYCIN HYDROCHLORIDE 750 MG: 750 INJECTION, POWDER, LYOPHILIZED, FOR SOLUTION INTRAVENOUS at 19:40

## 2021-01-01 RX ADMIN — Medication 10 ML: at 15:53

## 2021-01-01 RX ADMIN — HEPARIN SODIUM 5000 UNITS: 5000 INJECTION INTRAVENOUS; SUBCUTANEOUS at 06:00

## 2021-01-01 RX ADMIN — CEFEPIME 2 G: 2 INJECTION, POWDER, FOR SOLUTION INTRAVENOUS at 18:29

## 2021-01-01 RX ADMIN — METRONIDAZOLE 500 MG: 500 INJECTION, SOLUTION INTRAVENOUS at 22:12

## 2021-01-01 RX ADMIN — HYDRALAZINE HYDROCHLORIDE 10 MG: 20 INJECTION INTRAMUSCULAR; INTRAVENOUS at 20:02

## 2021-01-01 RX ADMIN — LORAZEPAM 1 MG: 2 INJECTION INTRAMUSCULAR; INTRAVENOUS at 08:51

## 2021-01-01 RX ADMIN — LORAZEPAM 1 MG: 2 INJECTION INTRAMUSCULAR; INTRAVENOUS at 05:39

## 2021-01-01 RX ADMIN — Medication 10 ML: at 06:13

## 2021-01-01 RX ADMIN — DEXTROSE MONOHYDRATE AND SODIUM CHLORIDE 100 ML/HR: 5; .45 INJECTION, SOLUTION INTRAVENOUS at 00:58

## 2021-01-01 RX ADMIN — DEXTROSE MONOHYDRATE AND SODIUM CHLORIDE 100 ML/HR: 5; .45 INJECTION, SOLUTION INTRAVENOUS at 03:35

## 2021-01-01 RX ADMIN — DEXAMETHASONE SODIUM PHOSPHATE 6 MG: 4 INJECTION, SOLUTION INTRAMUSCULAR; INTRAVENOUS at 09:18

## 2021-01-01 RX ADMIN — Medication 10 ML: at 05:38

## 2021-01-01 RX ADMIN — ATORVASTATIN CALCIUM 10 MG: 10 TABLET, FILM COATED ORAL at 21:43

## 2021-01-01 RX ADMIN — METRONIDAZOLE 500 MG: 500 INJECTION, SOLUTION INTRAVENOUS at 21:00

## 2021-01-01 RX ADMIN — GLYCOPYRROLATE 0.2 MG: 0.2 INJECTION, SOLUTION INTRAMUSCULAR; INTRAVENOUS at 08:51

## 2021-01-01 RX ADMIN — Medication 10 ML: at 22:13

## 2021-01-01 RX ADMIN — LORAZEPAM 0.5 MG: 2 INJECTION INTRAMUSCULAR; INTRAVENOUS at 15:53

## 2021-01-01 RX ADMIN — LORAZEPAM 0.5 MG: 2 INJECTION INTRAMUSCULAR; INTRAVENOUS at 00:35

## 2021-01-01 RX ADMIN — HYDROMORPHONE HYDROCHLORIDE 1 MG: 1 INJECTION, SOLUTION INTRAMUSCULAR; INTRAVENOUS; SUBCUTANEOUS at 05:39

## 2021-01-01 RX ADMIN — HYDROMORPHONE HYDROCHLORIDE 0.5 MG: 1 INJECTION, SOLUTION INTRAMUSCULAR; INTRAVENOUS; SUBCUTANEOUS at 04:00

## 2021-01-01 RX ADMIN — CEFEPIME 2 G: 2 INJECTION, POWDER, FOR SOLUTION INTRAVENOUS at 15:58

## 2021-01-01 RX ADMIN — DEXTROSE MONOHYDRATE, SODIUM CHLORIDE, AND POTASSIUM CHLORIDE: 50; 4.5; 2.98 INJECTION, SOLUTION INTRAVENOUS at 03:41

## 2021-01-01 RX ADMIN — SODIUM CHLORIDE, POTASSIUM CHLORIDE, SODIUM LACTATE AND CALCIUM CHLORIDE 1000 ML: 600; 310; 30; 20 INJECTION, SOLUTION INTRAVENOUS at 12:40

## 2021-01-01 RX ADMIN — GLYCOPYRROLATE 0.2 MG: 0.2 INJECTION, SOLUTION INTRAMUSCULAR; INTRAVENOUS at 03:59

## 2021-01-01 RX ADMIN — METRONIDAZOLE 500 MG: 500 INJECTION, SOLUTION INTRAVENOUS at 22:18

## 2021-01-01 RX ADMIN — SODIUM CHLORIDE, POTASSIUM CHLORIDE, SODIUM LACTATE AND CALCIUM CHLORIDE 347 ML: 600; 310; 30; 20 INJECTION, SOLUTION INTRAVENOUS at 14:08

## 2021-01-01 RX ADMIN — SODIUM CHLORIDE, POTASSIUM CHLORIDE, SODIUM LACTATE AND CALCIUM CHLORIDE 1000 ML: 600; 310; 30; 20 INJECTION, SOLUTION INTRAVENOUS at 11:04

## 2021-01-01 RX ADMIN — HYDROMORPHONE HYDROCHLORIDE 0.5 MG: 1 INJECTION, SOLUTION INTRAMUSCULAR; INTRAVENOUS; SUBCUTANEOUS at 09:26

## 2021-01-01 RX ADMIN — HEPARIN SODIUM 5000 UNITS: 5000 INJECTION INTRAVENOUS; SUBCUTANEOUS at 22:13

## 2021-01-01 RX ADMIN — DEXTROSE MONOHYDRATE, SODIUM CHLORIDE, AND POTASSIUM CHLORIDE: 50; 4.5; 2.98 INJECTION, SOLUTION INTRAVENOUS at 06:04

## 2021-01-01 RX ADMIN — DEXTROSE MONOHYDRATE AND SODIUM CHLORIDE 75 ML/HR: 5; .9 INJECTION, SOLUTION INTRAVENOUS at 15:58

## 2021-01-01 RX ADMIN — PANTOPRAZOLE SODIUM 40 MG: 40 INJECTION, POWDER, FOR SOLUTION INTRAVENOUS at 08:48

## 2021-01-01 RX ADMIN — HYDROMORPHONE HYDROCHLORIDE 0.5 MG: 1 INJECTION, SOLUTION INTRAMUSCULAR; INTRAVENOUS; SUBCUTANEOUS at 16:29

## 2021-01-01 RX ADMIN — LORAZEPAM 1 MG: 2 INJECTION INTRAMUSCULAR; INTRAVENOUS at 17:26

## 2021-01-01 RX ADMIN — DEXTROSE MONOHYDRATE AND SODIUM CHLORIDE 100 ML/HR: 5; .45 INJECTION, SOLUTION INTRAVENOUS at 04:36

## 2021-01-01 RX ADMIN — Medication 10 ML: at 17:39

## 2021-01-01 RX ADMIN — LORAZEPAM 0.5 MG: 2 INJECTION INTRAMUSCULAR; INTRAVENOUS at 16:29

## 2021-01-01 RX ADMIN — HYDROMORPHONE HYDROCHLORIDE 0.5 MG: 1 INJECTION, SOLUTION INTRAMUSCULAR; INTRAVENOUS; SUBCUTANEOUS at 00:35

## 2021-01-01 RX ADMIN — LORAZEPAM 0.5 MG: 2 INJECTION INTRAMUSCULAR; INTRAVENOUS at 12:35

## 2021-01-01 RX ADMIN — DEXTROSE MONOHYDRATE AND SODIUM CHLORIDE 75 ML/HR: 5; .9 INJECTION, SOLUTION INTRAVENOUS at 06:35

## 2021-01-01 RX ADMIN — Medication 10 ML: at 15:14

## 2021-01-01 RX ADMIN — HYDROMORPHONE HYDROCHLORIDE 1 MG: 1 INJECTION, SOLUTION INTRAMUSCULAR; INTRAVENOUS; SUBCUTANEOUS at 13:23

## 2021-01-01 RX ADMIN — CEFEPIME 2 G: 2 INJECTION, POWDER, FOR SOLUTION INTRAVENOUS at 19:34

## 2021-01-01 RX ADMIN — PANTOPRAZOLE SODIUM 40 MG: 40 INJECTION, POWDER, FOR SOLUTION INTRAVENOUS at 09:18

## 2021-01-01 RX ADMIN — Medication 10 ML: at 14:00

## 2021-01-01 RX ADMIN — HYDROMORPHONE HYDROCHLORIDE 1 MG: 1 INJECTION, SOLUTION INTRAMUSCULAR; INTRAVENOUS; SUBCUTANEOUS at 08:51

## 2021-01-01 RX ADMIN — HYDROMORPHONE HYDROCHLORIDE 0.5 MG: 1 INJECTION, SOLUTION INTRAMUSCULAR; INTRAVENOUS; SUBCUTANEOUS at 12:34

## 2021-01-01 RX ADMIN — REMDESIVIR 200 MG: 100 INJECTION, POWDER, LYOPHILIZED, FOR SOLUTION INTRAVENOUS at 16:48

## 2021-01-01 RX ADMIN — LORAZEPAM 0.5 MG: 2 INJECTION INTRAMUSCULAR; INTRAVENOUS at 00:31

## 2021-01-01 RX ADMIN — HYDROMORPHONE HYDROCHLORIDE 0.5 MG: 1 INJECTION, SOLUTION INTRAMUSCULAR; INTRAVENOUS; SUBCUTANEOUS at 21:07

## 2021-01-01 RX ADMIN — LORAZEPAM 0.5 MG: 2 INJECTION INTRAMUSCULAR; INTRAVENOUS at 17:29

## 2021-01-01 RX ADMIN — GLYCOPYRROLATE 0.2 MG: 0.2 INJECTION, SOLUTION INTRAMUSCULAR; INTRAVENOUS at 13:23

## 2021-01-01 RX ADMIN — HYDROMORPHONE HYDROCHLORIDE 1 MG: 1 INJECTION, SOLUTION INTRAMUSCULAR; INTRAVENOUS; SUBCUTANEOUS at 01:13

## 2021-01-01 RX ADMIN — DEXAMETHASONE SODIUM PHOSPHATE 6 MG: 4 INJECTION, SOLUTION INTRAMUSCULAR; INTRAVENOUS at 10:12

## 2021-01-01 RX ADMIN — REMDESIVIR 100 MG: 100 INJECTION, POWDER, LYOPHILIZED, FOR SOLUTION INTRAVENOUS at 15:32

## 2021-01-01 RX ADMIN — POTASSIUM CHLORIDE 10 MEQ: 10 INJECTION, SOLUTION INTRAVENOUS at 06:57

## 2021-01-01 RX ADMIN — HYDROMORPHONE HYDROCHLORIDE 1 MG: 1 INJECTION, SOLUTION INTRAMUSCULAR; INTRAVENOUS; SUBCUTANEOUS at 21:24

## 2021-01-01 RX ADMIN — GLYCOPYRROLATE 0.2 MG: 0.2 INJECTION, SOLUTION INTRAMUSCULAR; INTRAVENOUS at 00:14

## 2021-01-01 RX ADMIN — Medication 10 ML: at 05:50

## 2021-01-01 RX ADMIN — POTASSIUM CHLORIDE 10 MEQ: 10 INJECTION, SOLUTION INTRAVENOUS at 15:49

## 2021-01-01 RX ADMIN — INSULIN LISPRO 1 UNITS: 100 INJECTION, SOLUTION INTRAVENOUS; SUBCUTANEOUS at 01:05

## 2021-01-01 RX ADMIN — SODIUM CHLORIDE 500 ML: 9 INJECTION, SOLUTION INTRAVENOUS at 12:52

## 2021-01-01 RX ADMIN — HYDROMORPHONE HYDROCHLORIDE 0.5 MG: 1 INJECTION, SOLUTION INTRAMUSCULAR; INTRAVENOUS; SUBCUTANEOUS at 05:00

## 2021-01-01 RX ADMIN — Medication 10 ML: at 21:01

## 2021-01-01 RX ADMIN — HEPARIN SODIUM 5000 UNITS: 5000 INJECTION INTRAVENOUS; SUBCUTANEOUS at 05:50

## 2021-01-01 RX ADMIN — ACETAMINOPHEN 650 MG: 650 SUPPOSITORY RECTAL at 20:57

## 2021-01-01 RX ADMIN — DEXTROSE MONOHYDRATE, SODIUM CHLORIDE, AND POTASSIUM CHLORIDE: 50; 4.5; 2.98 INJECTION, SOLUTION INTRAVENOUS at 14:10

## 2021-01-01 RX ADMIN — HYDROMORPHONE HYDROCHLORIDE 0.5 MG: 1 INJECTION, SOLUTION INTRAMUSCULAR; INTRAVENOUS; SUBCUTANEOUS at 09:17

## 2021-01-01 RX ADMIN — METRONIDAZOLE 500 MG: 500 INJECTION, SOLUTION INTRAVENOUS at 10:08

## 2021-01-01 RX ADMIN — LORAZEPAM 0.5 MG: 2 INJECTION INTRAMUSCULAR; INTRAVENOUS at 09:17

## 2021-01-01 RX ADMIN — LORAZEPAM 0.5 MG: 2 INJECTION INTRAMUSCULAR; INTRAVENOUS at 20:57

## 2021-01-01 RX ADMIN — POTASSIUM CHLORIDE 10 MEQ: 10 INJECTION, SOLUTION INTRAVENOUS at 12:41

## 2021-01-01 RX ADMIN — METRONIDAZOLE 500 MG: 500 INJECTION, SOLUTION INTRAVENOUS at 14:05

## 2021-01-01 RX ADMIN — PANTOPRAZOLE SODIUM 40 MG: 40 INJECTION, POWDER, FOR SOLUTION INTRAVENOUS at 14:05

## 2021-01-01 RX ADMIN — CEFEPIME 2 G: 2 INJECTION, POWDER, FOR SOLUTION INTRAVENOUS at 17:37

## 2021-01-01 RX ADMIN — REMDESIVIR 100 MG: 100 INJECTION, POWDER, LYOPHILIZED, FOR SOLUTION INTRAVENOUS at 17:39

## 2021-01-01 RX ADMIN — HYDROMORPHONE HYDROCHLORIDE 1 MG: 1 INJECTION, SOLUTION INTRAMUSCULAR; INTRAVENOUS; SUBCUTANEOUS at 17:25

## 2021-01-01 RX ADMIN — PANTOPRAZOLE SODIUM 40 MG: 40 INJECTION, POWDER, FOR SOLUTION INTRAVENOUS at 15:13

## 2021-01-01 RX ADMIN — DEXTROSE MONOHYDRATE AND SODIUM CHLORIDE 100 ML/HR: 5; .45 INJECTION, SOLUTION INTRAVENOUS at 12:09

## 2021-01-01 RX ADMIN — LORAZEPAM 0.5 MG: 2 INJECTION INTRAMUSCULAR; INTRAVENOUS at 04:00

## 2021-01-01 RX ADMIN — Medication 10 ML: at 06:10

## 2021-01-01 RX ADMIN — LORAZEPAM 0.5 MG: 2 INJECTION INTRAMUSCULAR; INTRAVENOUS at 05:00

## 2021-01-01 RX ADMIN — REMDESIVIR 100 MG: 100 INJECTION, POWDER, LYOPHILIZED, FOR SOLUTION INTRAVENOUS at 17:35

## 2021-01-01 RX ADMIN — LORAZEPAM 0.5 MG: 2 INJECTION INTRAMUSCULAR; INTRAVENOUS at 11:34

## 2021-01-01 RX ADMIN — HEPARIN SODIUM 5000 UNITS: 5000 INJECTION INTRAVENOUS; SUBCUTANEOUS at 13:53

## 2021-01-01 RX ADMIN — GLYCOPYRROLATE 0.2 MG: 0.2 INJECTION, SOLUTION INTRAMUSCULAR; INTRAVENOUS at 17:25

## 2021-01-01 RX ADMIN — GLYCOPYRROLATE 0.2 MG: 0.2 INJECTION, SOLUTION INTRAMUSCULAR; INTRAVENOUS at 20:16

## 2021-01-01 RX ADMIN — DEXTROSE MONOHYDRATE AND SODIUM CHLORIDE 100 ML/HR: 5; .45 INJECTION, SOLUTION INTRAVENOUS at 04:32

## 2021-01-01 RX ADMIN — HEPARIN SODIUM 5000 UNITS: 5000 INJECTION INTRAVENOUS; SUBCUTANEOUS at 06:14

## 2021-01-01 RX ADMIN — DEXAMETHASONE SODIUM PHOSPHATE 6 MG: 4 INJECTION, SOLUTION INTRAMUSCULAR; INTRAVENOUS at 09:19

## 2021-01-01 RX ADMIN — HEPARIN SODIUM 5000 UNITS: 5000 INJECTION INTRAVENOUS; SUBCUTANEOUS at 21:00

## 2021-01-01 RX ADMIN — ENOXAPARIN SODIUM 30 MG: 30 INJECTION SUBCUTANEOUS at 09:19

## 2021-01-01 RX ADMIN — REMDESIVIR 100 MG: 100 INJECTION, POWDER, LYOPHILIZED, FOR SOLUTION INTRAVENOUS at 16:21

## 2021-01-01 RX ADMIN — LORAZEPAM 0.5 MG: 2 INJECTION INTRAMUSCULAR; INTRAVENOUS at 09:25

## 2021-01-01 RX ADMIN — Medication 10 ML: at 21:54

## 2021-01-01 RX ADMIN — POTASSIUM CHLORIDE 10 MEQ: 10 INJECTION, SOLUTION INTRAVENOUS at 05:49

## 2021-01-01 RX ADMIN — HYDROMORPHONE HYDROCHLORIDE 0.5 MG: 1 INJECTION, SOLUTION INTRAMUSCULAR; INTRAVENOUS; SUBCUTANEOUS at 20:55

## 2021-01-01 RX ADMIN — DEXAMETHASONE SODIUM PHOSPHATE 6 MG: 4 INJECTION, SOLUTION INTRAMUSCULAR; INTRAVENOUS at 15:13

## 2021-01-01 RX ADMIN — CEFEPIME 2 G: 2 INJECTION, POWDER, FOR SOLUTION INTRAVENOUS at 18:23

## 2021-01-01 RX ADMIN — HYDROMORPHONE HYDROCHLORIDE 0.5 MG: 1 INJECTION, SOLUTION INTRAMUSCULAR; INTRAVENOUS; SUBCUTANEOUS at 17:29

## 2021-01-01 RX ADMIN — METRONIDAZOLE 500 MG: 500 INJECTION, SOLUTION INTRAVENOUS at 15:13

## 2021-01-01 RX ADMIN — GLYCOPYRROLATE 0.2 MG: 0.2 INJECTION, SOLUTION INTRAMUSCULAR; INTRAVENOUS at 09:41

## 2021-01-01 RX ADMIN — DEXTROSE MONOHYDRATE AND SODIUM CHLORIDE 100 ML/HR: 5; .45 INJECTION, SOLUTION INTRAVENOUS at 17:37

## 2021-01-01 RX ADMIN — Medication 10 ML: at 22:19

## 2021-01-01 RX ADMIN — DEXAMETHASONE SODIUM PHOSPHATE 6 MG: 4 INJECTION, SOLUTION INTRAMUSCULAR; INTRAVENOUS at 08:48

## 2021-01-01 RX ADMIN — HYDRALAZINE HYDROCHLORIDE 10 MG: 20 INJECTION INTRAMUSCULAR; INTRAVENOUS at 20:27

## 2021-01-01 RX ADMIN — DEXAMETHASONE SODIUM PHOSPHATE 6 MG: 4 INJECTION, SOLUTION INTRAMUSCULAR; INTRAVENOUS at 14:05

## 2021-01-01 RX ADMIN — LORAZEPAM 0.5 MG: 2 INJECTION INTRAMUSCULAR; INTRAVENOUS at 21:06

## 2021-01-01 RX ADMIN — LORAZEPAM 1 MG: 2 INJECTION INTRAMUSCULAR; INTRAVENOUS at 13:23

## 2021-01-01 RX ADMIN — HEPARIN SODIUM 5000 UNITS: 5000 INJECTION INTRAVENOUS; SUBCUTANEOUS at 21:56

## 2021-01-01 RX ADMIN — VANCOMYCIN HYDROCHLORIDE 1000 MG: 1 INJECTION, POWDER, LYOPHILIZED, FOR SOLUTION INTRAVENOUS at 17:50

## 2021-01-01 RX ADMIN — POTASSIUM CHLORIDE 10 MEQ: 10 INJECTION, SOLUTION INTRAVENOUS at 13:51

## 2021-01-01 RX ADMIN — DEXTROSE MONOHYDRATE, SODIUM CHLORIDE, AND POTASSIUM CHLORIDE: 50; 4.5; 2.98 INJECTION, SOLUTION INTRAVENOUS at 18:34

## 2021-01-01 RX ADMIN — DEXAMETHASONE SODIUM PHOSPHATE 6 MG: 4 INJECTION, SOLUTION INTRAMUSCULAR; INTRAVENOUS at 13:51

## 2021-01-01 RX ADMIN — METRONIDAZOLE 500 MG: 500 INJECTION, SOLUTION INTRAVENOUS at 21:43

## 2021-01-01 RX ADMIN — ENOXAPARIN SODIUM 30 MG: 30 INJECTION SUBCUTANEOUS at 10:13

## 2021-01-01 RX ADMIN — LORAZEPAM 1 MG: 2 INJECTION INTRAMUSCULAR; INTRAVENOUS at 01:13

## 2021-01-01 RX ADMIN — Medication 10 ML: at 06:04

## 2021-01-01 RX ADMIN — METRONIDAZOLE 500 MG: 500 INJECTION, SOLUTION INTRAVENOUS at 09:18

## 2021-01-01 RX ADMIN — HEPARIN SODIUM 5000 UNITS: 5000 INJECTION INTRAVENOUS; SUBCUTANEOUS at 22:18

## 2021-01-01 RX ADMIN — HYDROMORPHONE HYDROCHLORIDE 0.5 MG: 1 INJECTION, SOLUTION INTRAMUSCULAR; INTRAVENOUS; SUBCUTANEOUS at 00:31

## 2021-01-01 RX ADMIN — HEPARIN SODIUM 5000 UNITS: 5000 INJECTION INTRAVENOUS; SUBCUTANEOUS at 15:13

## 2021-01-01 RX ADMIN — LORAZEPAM 1 MG: 2 INJECTION INTRAMUSCULAR; INTRAVENOUS at 21:25

## 2021-01-01 RX ADMIN — Medication 10 ML: at 21:00

## 2021-01-01 RX ADMIN — CEFEPIME HYDROCHLORIDE 2 G: 2 INJECTION, POWDER, FOR SOLUTION INTRAVENOUS at 13:52

## 2021-01-01 RX ADMIN — DOCUSATE SODIUM 100 MG: 100 CAPSULE, LIQUID FILLED ORAL at 19:35

## 2021-01-01 RX ADMIN — HEPARIN SODIUM 5000 UNITS: 5000 INJECTION INTRAVENOUS; SUBCUTANEOUS at 13:27

## 2021-01-01 RX ADMIN — DEXTROSE MONOHYDRATE AND SODIUM CHLORIDE 100 ML/HR: 5; .45 INJECTION, SOLUTION INTRAVENOUS at 16:49

## 2021-01-01 RX ADMIN — Medication 10 ML: at 16:49

## 2021-01-01 RX ADMIN — Medication 10 ML: at 05:48

## 2021-01-19 PROBLEM — R65.20 SEVERE SEPSIS (HCC): Chronic | Status: ACTIVE | Noted: 2021-01-01

## 2021-01-19 PROBLEM — A41.9 SEVERE SEPSIS (HCC): Status: ACTIVE | Noted: 2021-01-01

## 2021-01-19 PROBLEM — J96.01 ACUTE RESPIRATORY FAILURE WITH HYPOXIA (HCC): Status: ACTIVE | Noted: 2021-01-01

## 2021-01-19 PROBLEM — N17.9 AKI (ACUTE KIDNEY INJURY) (HCC): Status: ACTIVE | Noted: 2021-01-01

## 2021-01-19 PROBLEM — G93.41 METABOLIC ENCEPHALOPATHY: Status: ACTIVE | Noted: 2021-01-01

## 2021-01-19 PROBLEM — J12.82 PNEUMONIA DUE TO COVID-19 VIRUS: Status: ACTIVE | Noted: 2021-01-01

## 2021-01-19 PROBLEM — R65.20 SEVERE SEPSIS (HCC): Status: ACTIVE | Noted: 2021-01-01

## 2021-01-19 PROBLEM — U07.1 PNEUMONIA DUE TO COVID-19 VIRUS: Status: ACTIVE | Noted: 2021-01-01

## 2021-01-19 PROBLEM — A41.9 SEVERE SEPSIS (HCC): Chronic | Status: ACTIVE | Noted: 2021-01-01

## 2021-01-19 NOTE — ED NOTES
Pt dysphagia performed and patient tolerated purees but had coughing and clearing with sip of water from cup. MD notified. 1800 colace held.

## 2021-01-19 NOTE — PROGRESS NOTES
Pharmacy Automatic Renal Dosing Protocol - Antimicrobials Indication for Antimicrobials: CAP in setting of covid Current Regimen of Each Antimicrobial: 
[id number] q 24h (Start Date ; Day # ) Vancomycin pharmacy to dose (start: , day ) Previous Antimicrobial Therapy: 
 
Goal Level: VANCOMYCIN TROUGH GOAL RANGE Vancomycin Trough: 15 - 20 mcg/mL  (AUC: 400 - 600 mg/hr/Liter/day) Date Dose & Interval Measured (mcg/mL) Extrapolated (mcg/mL) Date & time of next level:  Significant Cultures:  
 blood x 2 - prelim Radiology / Imaging results: (X-ray, CT scan or MRI):  
: chest xray: Patchy airspace disease in the left upper lobe and left lower lobe. Paralysis, amputations, malnutrition: none Labs: 
Recent Labs  
  21 
1002 CREA 1.21* BUN 36* WBC 13.5* Temp (24hrs), Av °F (37.2 °C), Min:99 °F (37.2 °C), Max:99 °F (37.2 °C) Is the Patient on Dialysis? No 
 
Creatinine Clearance (mL/min):  
CrCl (Actual Body Weight): 21.5 Impression/Plan:  
Change cefepime to 2g q 24hr per renal dosing protocol Vancomycin 1000mg IV x 1, then 500mg every 24h to yield a trough of ~15mcg/mL. Cbc and cmp daily Antimicrobial stop date 7 days Pharmacy will follow daily and adjust medications as appropriate for renal function and/or serum levels. Thank you, CYRIL EarlyD 
 
Recommended duration of therapy 
http://[facility]/Pharmacy/Clinical%20Companion/Duration%20of%20ABX%20therapy. docx Renal Dosing 
http://[facility]/[place]/[facility]/Pharmacy/Clinical%20Companion/Renal%20Dosing%[id number]. pdf

## 2021-01-19 NOTE — ED NOTES
Pt arrived via EMS, with CC of Low O2 sats at [facility]. Pt on non rebreather on arrival, o2 sat on forehead probe reading 100%, non rebreather removed and pt maintaining   % on RA. Pt extremities cool and not picking up an adequate waveform. Pt is calm and cooperative, answering yes no questions. Call to [facility] to determine pt mental status baseline as EMS unsure of baseline for verbal interactions.

## 2021-01-19 NOTE — H&P
Hospitalist Admission Note NAME: Vamsi Mckenzie :  1929 MRN:  [id number] Date/Time:  2021 4:13 PM 
 
Patient PCP: Latrice Almeida MD 
 
Please note that this dictation was completed with OpenWhere, the computer voice recognition software. Quite often unanticipated grammatical, syntax, homophones, and other interpretive errors are inadvertently transcribed by the computer software. Please disregard these errors. Please excuse any errors that have escaped final proofreading 
______________________________________________________________________ Assessment & Plan: 
Left upper lobe pneumonia, POA due to covid-19 Acute hypoxic respiratory failure, POA Inflammatory markers elevated from Covid with D-dimer over 10 
--Dexamethasone x10 days. Continue vitamin D, vitamin C and zinc 
--Continue cefepime and vancomycin to empirically cover H CAP. Can discontinue if procalcitonin is normal over the next 2 days --Requiring 2 L of oxygen, satting 93 to 100% on that Acute metabolic encephalopathy Dehydration with hypernatremia Mild dementia 
--Patient is awake and oriented to self and follow simple commands is unable to maintain conversation, does not know month or year. Slurred speech and generalized weakness. CT head negative 
--IV fluid bolus given in the ER. Continue maintenance fluid with D5 half-normal 
--Hold Lasix. IV potassium given in ER for hypokalemia. Check magnesium. Repeat  
labs in the a.m. 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 Continue allopurinol Intrathoracic aortic aneurysm 
--Per her Sister Yvonne Dutta her aneurysm has doubled in size to >5cm when she saw her cardiologist this past year and had an echo Cardiac Valvular disease 
--very loud systolic murmur c/w mitral valve by exam. 
 
Postmenopausal vaginal bleeding with thickened endometrium --Had seen 5 unk Dr. Saddie Alpers who recommended hysteroscopy but does not look like the patient has had that yet Body mass index is 19.43 kg/m². Code: Has full code listed from nursing home. DVT prophylaxis: Subcu heparin Surrogate decision maker: Sister David Quintanilla [phone]. Patient does not have advanced directive per sister. Subjective: CHIEF COMPLAINT: Hypoxia HISTORY OF PRESENT ILLNESS:    
Brielle Fuentes is a 80 y.o. female past medical history of mild dementia hypertension hyperlipidemia, valvular heart disease, tophaceous gout, thoracic aortic aneurysm who typically resides in an assisted living facility until she came down with Covid and was transferred to [address] [place] [address] for further monitoring. She is sent today from the nursing home due to hypoxia with O2 sat reportedly of 75% on room air, 80% on 4 L and 95% on nonrebreather EMS. In the ER it has been difficult to get a good Plath reading for her O2 sat. ABG on room air showed O2 sat of 92% with PO2 of 61. Chest x-ray shows patchy airspace disease in the left upper and left lower lobe. We were asked to admit for work up and evaluation of the above problems. Past Medical History:  
Diagnosis Date  GERD (gastroesophageal reflux disease)  HTN (hypertension)  Hyperlipidemia  Postmenopausal vaginal bleeding  Tophaceous gout History reviewed. No pertinent surgical history. Social History Tobacco Use  Smoking status: Never Smoker  Smokeless tobacco: Never Used Substance Use Topics  Alcohol use: Not on file Drug use:   
 
 
Family History Family history unknown: Yes Unable to obtain Allergies Allergen Reactions  Nsaids (Non-Steroidal Anti-Inflammatory Drug) Unknown (comments)  Penicillins Unknown (comments)  Sulfa (Sulfonamide Antibiotics) Unknown (comments)  Sulfasalazine Unknown (comments) Prior to Admission medications Medication Sig Start Date End Date Taking? Authorizing Provider  
ascorbic acid, vitamin C, (Vitamin C) 500 mg tablet Take 500 mg by mouth daily. 21 Yes Provider, Historical  
cholecalciferol (Vitamin D3) (1000 Units /25 mcg) tablet Take 1,000 Units by mouth daily. Yes Provider, Historical  
zinc 50 mg tab tablet Take 50 mg by mouth daily. 21 Yes Provider, Historical  
allopurinoL (ZYLOPRIM) 100 mg tablet Take 100 mg by mouth daily. Yes Provider, Historical  
atorvastatin (LIPITOR) 10 mg tablet Take 10 mg by mouth nightly. Yes Provider, Historical  
cetirizine (ZYRTEC) 10 mg tablet Take 10 mg by mouth daily. Yes Provider, Historical  
cyanocobalamin (VITAMIN B12) 1,000 mcg/mL injection 1,000 mcg by IntraMUSCular route every thirty (30) days. 15 of each month 10/9/20  Yes Provider, Historical  
folic acid (FOLVITE) 1 mg tablet Take 1 mg by mouth daily. Yes Provider, Historical  
furosemide (LASIX) 20 mg tablet Take 20 mg by mouth daily. Yes Provider, Historical  
pantoprazole (PROTONIX) 20 mg tablet Take 20 mg by mouth. Yes Provider, Historical  
acetaminophen (TYLENOL) 325 mg tablet Take 650 mg by mouth every four (4) hours as needed for Pain (mild to moderate pain). Yes Provider, Historical  
docusate sodium (COLACE) 100 mg capsule Take 200 mg by mouth two (2) times daily as needed for Constipation. Yes Provider, Historical  
 
REVIEW OF SYSTEMS:  POSITIVE= Bold. Negative = normal text Unable to obtain due to mental status. Patient nod yes to SOB. Objective: VITALS:   
Visit Vitals BP (!) 181/83 Pulse 83 Temp 99 °F (37.2 °C) Resp 26 Ht 4' 11.84\" (1.52 m) Wt 44.9 kg (98 lb 15.8 oz) SpO2 (!) 68% BMI 19.43 kg/m² Temp (24hrs), Av °F (37.2 °C), Min:99 °F (37.2 °C), Max:99 °F (37.2 °C) Body mass index is 19.43 kg/m². PHYSICAL EXAM: 
 
General:    Alert, ill-appearing, thin cooperative, no distress, appears stated age. HEENT: Atraumatic, anicteric sclerae, pink conjunctivae No oral ulcers, mucosa dry, throat clear. Hearing intact. Neck:  Supple, symmetrical,  thyroid: non tender Lungs:   Bilateral crackles no Wheezing or Rhonchi. Chest wall:  No tenderness  No Accessory muscle use. Heart:   Regular  rhythm, 3 out of 6 holosystolic murmur   No gallop. No edema. Abdomen:   Soft, non-tender. Not distended. Bowel sounds normal. No masses Extremities: No cyanosis. No clubbing Skin:     Not pale Not Jaundiced  No rashes. Poor skin turgor with tenting. Dusky purple discoloration to her fingers bilaterally, reticular purple discoloration to dorsum feet and left 3rd toe concerning for cyanosis Psych:  Poor insight. Not depressed. Not anxious or agitated. Neurologic: EOMs intact. No facial asymmetry. Slurred speech. Decreased  in right hand, arms 5/5, legs 3/5, Alert and oriented X self only IMAGING RESULTS: 
 []       I have personally reviewed the actual   []     CXR  []     CT scan CXR: 
CT : 
EKG: 
 ________________________________________________________________________ Care Plan discussed with: 
  Comments Patient y Family  y Amelia Rubio RN Care Manager Consultant:     
________________________________________________________________________ Prophylaxis: 
GI PPI  
DVT heparin  
________________________________________________________________________ Recommended Disposition:  
Home with Family HH/PT/OT/RN   
[facility]  
[facility]   
________________________________________________________________________ Code Status: 
Full Code y  
DNR/DNI   
________________________________________________________________________ TOTAL TIME:  50 minutes Comments Reviewed previous records  
>50% of visit spent in counseling and coordination of care  Discussion with patient and/or family and questions answered ______________________________________________________________________ Dallas Pak MD 
 
 
Procedures: see electronic medical records for all procedures/Xrays and details which were not copied into this note but were reviewed prior to creation of Plan. LAB DATA REVIEWED:   
Recent Results (from the past 24 hour(s)) CBC WITH AUTOMATED DIFF Collection Time: 01/19/21 10:02 AM  
Result Value Ref Range WBC 13.5 (H) 3.6 - 11.0 K/uL  
 RBC 4.96 3.80 - 5.20 M/uL  
 HGB 14.1 11.5 - 16.0 g/dL HCT 45.2 35.0 - 47.0 % MCV 91.1 80.0 - 99.0 FL  
 MCH 28.4 26.0 - 34.0 PG  
 MCHC 31.2 30.0 - 36.5 g/dL  
 RDW 13.4 11.5 - 14.5 % PLATELET 453 851 - 061 K/uL MPV 10.8 8.9 - 12.9 FL  
 NRBC 0.0 0  WBC ABSOLUTE NRBC 0.00 0.00 - 0.01 K/uL NEUTROPHILS 90 (H) 32 - 75 % LYMPHOCYTES 4 (L) 12 - 49 % MONOCYTES 5 5 - 13 % EOSINOPHILS 0 0 - 7 % BASOPHILS 0 0 - 1 % IMMATURE GRANULOCYTES 1 (H) 0.0 - 0.5 % ABS. NEUTROPHILS 12.2 (H) 1.8 - 8.0 K/UL  
 ABS. LYMPHOCYTES 0.5 (L) 0.8 - 3.5 K/UL  
 ABS. MONOCYTES 0.7 0.0 - 1.0 K/UL  
 ABS. EOSINOPHILS 0.0 0.0 - 0.4 K/UL  
 ABS. BASOPHILS 0.0 0.0 - 0.1 K/UL  
 ABS. IMM. GRANS. 0.1 (H) 0.00 - 0.04 K/UL  
 DF SMEAR SCANNED    
 RBC COMMENTS NORMOCYTIC, NORMOCHROMIC METABOLIC PANEL, COMPREHENSIVE Collection Time: 01/19/21 10:02 AM  
Result Value Ref Range Sodium 147 (H) 136 - 145 mmol/L Potassium 3.1 (L) 3.5 - 5.1 mmol/L Chloride 110 (H) 97 - 108 mmol/L  
 CO2 31 21 - 32 mmol/L Anion gap 6 5 - 15 mmol/L Glucose 101 (H) 65 - 100 mg/dL BUN 36 (H) 6 - 20 MG/DL Creatinine 1.21 (H) 0.55 - 1.02 MG/DL  
 BUN/Creatinine ratio 30 (H) 12 - 20 GFR est AA 51 (L) >60 ml/min/1.73m2 GFR est non-AA 42 (L) >60 ml/min/1.73m2 Calcium 9.6 8.5 - 10.1 MG/DL Bilirubin, total 0.7 0.2 - 1.0 MG/DL  
 ALT (SGPT) 15 12 - 78 U/L  
 AST (SGOT) 29 15 - 37 U/L Alk. phosphatase 121 (H) 45 - 117 U/L Protein, total 7.2 6.4 - 8.2 g/dL Albumin 2.7 (L) 3.5 - 5.0 g/dL Globulin 4.5 (H) 2.0 - 4.0 g/dL A-G Ratio 0.6 (L) 1.1 - 2.2 SAMPLES BEING HELD Collection Time: 01/19/21 10:02 AM  
Result Value Ref Range SAMPLES BEING HELD  LAV, RED, 2 BLUE   
 COMMENT Add-on orders for these samples will be processed based on acceptable specimen integrity and analyte stability, which may vary by analyte. TROPONIN I Collection Time: 01/19/21 10:02 AM  
Result Value Ref Range Troponin-I, Qt. <0.05 <0.05 ng/mL POC LACTIC ACID Collection Time: 01/19/21 11:12 AM  
Result Value Ref Range Lactic Acid (POC) 1.77 0.40 - 2.00 mmol/L  
SARS-COV-2 Collection Time: 01/19/21 11:58 AM  
Result Value Ref Range Specimen source Nasopharyngeal    
 Specimen source NP SWAB COVID-19 rapid test Detected (AA) NOTD Specimen type NP Swab Health status Symptomatic Testing POC EG7 Collection Time: 01/19/21 12:03 PM  
Result Value Ref Range Calcium, ionized (POC) 1.29 1.12 - 1.32 mmol/L  
 pH (POC) 7.44 7.35 - 7.45    
 pCO2 (POC) 39.3 35.0 - 45.0 MMHG  
 pO2 (POC) 61 (L) 80 - 100 MMHG  
 HCO3 (POC) 26.5 (H) 22 - 26 MMOL/L Base excess (POC) 2 mmol/L  
 sO2 (POC) 92 92 - 97 % Site RIGHT RADIAL Device: ROOM AIR Allens test (POC) N/A Specimen type (POC) ARTERIAL Total resp. rate 16 EKG, 12 LEAD, INITIAL Collection Time: 01/19/21 12:03 PM  
Result Value Ref Range Ventricular Rate 85 BPM  
 Atrial Rate 85 BPM  
 P-R Interval 136 ms QRS Duration 90 ms Q-T Interval 360 ms QTC Calculation (Bezet) 428 ms Calculated P Axis 52 degrees Calculated R Axis -60 degrees Calculated T Axis 104 degrees Diagnosis Sinus rhythm with occasional premature ventricular complexes Left axis deviation Left ventricular hypertrophy with repolarization abnormality Inferior infarct , age undetermined No previous ECGs available URINALYSIS W/ REFLEX CULTURE  
 Collection Time: 01/19/21  2:01 PM  
 Specimen: Urine Result Value Ref Range Color YELLOW/STRAW Appearance CLEAR CLEAR Specific gravity 1.014 1.003 - 1.030    
 pH (UA) 5.5 5.0 - 8.0 Protein Negative NEG mg/dL Glucose Negative NEG mg/dL Ketone TRACE (A) NEG mg/dL Bilirubin Negative NEG Blood SMALL (A) NEG Urobilinogen 1.0 0.2 - 1.0 EU/dL Nitrites Negative NEG Leukocyte Esterase Negative NEG    
 WBC 0-4 0 - 4 /hpf  
 RBC 5-10 0 - 5 /hpf Epithelial cells FEW FEW /lpf Bacteria Negative NEG /hpf  
 UA:UC IF INDICATED CULTURE NOT INDICATED BY UA RESULT CNI    
PROTHROMBIN TIME + INR Collection Time: 01/19/21  2:01 PM  
Result Value Ref Range INR 1.1 0.9 - 1.1 Prothrombin time 11.5 (H) 9.0 - 11.1 sec FIBRINOGEN Collection Time: 01/19/21  2:01 PM  
Result Value Ref Range Fibrinogen 291 200 - 475 mg/dL LD Collection Time: 01/19/21  2:01 PM  
Result Value Ref Range  (H) 81 - 246 U/L  
D DIMER Collection Time: 01/19/21  2:01 PM  
Result Value Ref Range  D-dimer 11.24 (H) 0.00 - 0.65 mg/L FEU

## 2021-01-19 NOTE — ED PROVIDER NOTES
EMERGENCY DEPARTMENT HISTORY AND PHYSICAL EXAM 
     
 
Date: 1/19/2021 Patient Name: Brielle Fuentes Attending of Record: Shu Paz History of Presenting Illness Chief Complaint Patient presents with  Positive For Covid-19  
  low O2 sats per EMS and resident facility. History Provided By: Patient's Sister HPI: Brielle Fuentes is a 80 y.o. female, with unknown past medical history who stays in a nursing home who presents to the emergency department with reports of hypoxia where she was staying. Reportedly she was in the 80s on room air, and was requiring 15 L nonrebreather when she arrived with EMS. We were able to transition her to nasal cannula without any desaturation. Patient is unable to provide any further history. She is alert and oriented x0 and only intermittently responds to commands. History was able to be obtained from her sister who reports that she does have confusion issues but has been alert and oriented to her name and place recently and is able to have a conversation. PCP: Anton Means MD 
 
There are no other complaints, changes, or physical findings at this time. Current Facility-Administered Medications Medication Dose Route Frequency Provider Last Rate Last Admin  sodium chloride (NS) flush 5-10 mL  5-10 mL IntraVENous PRN Christina Alves MD      
 potassium chloride 10 mEq in 100 ml IVPB  10 mEq IntraVENous Whitley Aldridge  mL/hr at 01/19/21 1549 10 mEq at 01/19/21 1549  
 vancomycin (VANCOCIN) 1,000 mg in 0.9% sodium chloride 250 mL (VIAL-MATE)  1,000 mg IntraVENous NOW Viji Jimenez MD   1,000 mg at 01/19/21 1550  remdesivir 200 mg in 0.9% sodium chloride 250 mL IVPB  200 mg IntraVENous ONCE Alec Hernandez MD      
 Followed by  
Gerry Zhao ON 1/20/2021] remdesivir 100 mg in 0.9% sodium chloride 250 mL IVPB  100 mg IntraVENous Q24H Alec Hernandez MD      
  sodium chloride (NS) flush 5-40 mL  5-40 mL IntraVENous Q8H Faye Morales MD      
 sodium chloride (NS) flush 5-40 mL  5-40 mL IntraVENous PRN Faye Morales MD      
 acetaminophen (TYLENOL) tablet 650 mg  650 mg Oral Q6H PRN Faye Morales MD      
 Or  
 acetaminophen (TYLENOL) suppository 650 mg  650 mg Rectal Q6H PRN Faye Morales MD      
 polyethylene glycol [facility]) packet 17 g  17 g Oral DAILY PRN Faye Morales MD      
 promethazine (PHENERGAN) tablet 12.5 mg  12.5 mg Oral Q6H PRN Faye Morales MD      
 Or  
 ondansetron [facility]) injection 4 mg  4 mg IntraVENous Q6H PRN Faye Morales MD      
 acetaminophen (TYLENOL) tablet 650 mg  650 mg Oral Q6H PRN Faye Morales MD      
 Or  
 acetaminophen (TYLENOL) suppository 650 mg  650 mg Rectal Q6H PRN Faye Morales MD      
 guaiFENesin-dextromethorphan (ROBITUSSIN DM) 100-10 mg/5 mL syrup 5 mL  5 mL Oral Q4H PRN Faye Morales MD      
 [START ON 1/20/2021] cholecalciferol (VITAMIN D3) (1000 Units /25 mcg) tablet 2 Tab  2,000 Units Oral DAILY Faye Morales MD      
 heparin (porcine) injection 5,000 Units  5,000 Units SubCUTAneous Q8H Faye Morales MD      
 dextrose 5 % - 0.45% NaCl infusion  100 mL/hr IntraVENous CONTINUOUS Faye Morales MD      
 [START ON 1/20/2021] allopurinoL (ZYLOPRIM) tablet 100 mg  100 mg Oral DAILY MD Mehran Queen ON 1/20/2021] ascorbic acid (vitamin C) (VITAMIN C) tablet 500 mg  500 mg Oral DAILY Faye Morales MD      
 atorvastatin (LIPITOR) tablet 10 mg  10 mg Oral QHS Faye Morales MD      
 [START ON 6/50/5189] folic acid (FOLVITE) tablet 1 mg  1 mg Oral DAILY Faye Morales MD      
 [START ON 1/20/2021] pantoprazole (PROTONIX) tablet 40 mg  40 mg Oral ACB MD Mehran Queen ON 1/20/2021] zinc sulfate (ZINCATE) 220 (50) mg capsule 1 Cap  1 Cap Oral DAILY Faye Morales MD      
  docusate sodium (COLACE) capsule 100 mg  100 mg Oral BID Dayday Cox MD      
 [START ON 1/20/2021] dexamethasone (DECADRON) 4 mg/mL injection 6 mg  6 mg IntraVENous DAILY Dayday Cox MD      
 [START ON 1/20/2021] cefepime (MAXIPIME) 2 g in 0.9% sodium chloride (MBP/ADV) 100 mL MBP  2 g IntraVENous Q24H Dayday Cox MD      
 [START ON 1/20/2021] vancomycin (VANCOCIN) 500 mg in 0.9% sodium chloride (MBP/ADV) 100 mL MBP  500 mg IntraVENous Q24H Dayday Cox MD      
 
Current Outpatient Medications Medication Sig Dispense Refill  ascorbic acid, vitamin C, (Vitamin C) 500 mg tablet Take 500 mg by mouth daily.  cholecalciferol (Vitamin D3) (1000 Units /25 mcg) tablet Take 1,000 Units by mouth daily.  zinc 50 mg tab tablet Take 50 mg by mouth daily.  allopurinoL (ZYLOPRIM) 100 mg tablet Take 100 mg by mouth daily.  atorvastatin (LIPITOR) 10 mg tablet Take 10 mg by mouth nightly.  cetirizine (ZYRTEC) 10 mg tablet Take 10 mg by mouth daily.  cyanocobalamin (VITAMIN B12) 1,000 mcg/mL injection 1,000 mcg by IntraMUSCular route every thirty (30) days. 15th of each month  folic acid (FOLVITE) 1 mg tablet Take 1 mg by mouth daily.  furosemide (LASIX) 20 mg tablet Take 20 mg by mouth daily.  pantoprazole (PROTONIX) 20 mg tablet Take 20 mg by mouth.  acetaminophen (TYLENOL) 325 mg tablet Take 650 mg by mouth every four (4) hours as needed for Pain (mild to moderate pain).  docusate sodium (COLACE) 100 mg capsule Take 200 mg by mouth two (2) times daily as needed for Constipation. Past History Past Medical History: 
Past Medical History:  
Diagnosis Date  GERD (gastroesophageal reflux disease)  HTN (hypertension)  Hyperlipidemia  Postmenopausal vaginal bleeding  Tophaceous gout Past Surgical History: 
History reviewed. No pertinent surgical history. Family History: 
Family History Family history unknown: Yes  
 
 
Social History: 
Social History Tobacco Use  Smoking status: Never Smoker  Smokeless tobacco: Never Used Substance Use Topics  Alcohol use: Not on file  Drug use: Not on file Allergies: Allergies Allergen Reactions  Nsaids (Non-Steroidal Anti-Inflammatory Drug) Unknown (comments)  Penicillins Unknown (comments)  Sulfa (Sulfonamide Antibiotics) Unknown (comments)  Sulfasalazine Unknown (comments) Review of Systems Review of Systems unable to be obtained due to patient's clinical condition Physical Exam  
Physical Exam 
Constitutional:   
   Comments: Thin, cachectic HENT:  
   Head: Normocephalic and atraumatic. Mouth/Throat:  
   Mouth: Mucous membranes are dry. Eyes:  
   Extraocular Movements: Extraocular movements intact. Pupils: Pupils are equal, round, and reactive to light. Neck: Musculoskeletal: Normal range of motion and neck supple. Cardiovascular:  
   Rate and Rhythm: Regular rhythm. Tachycardia present. Pulses: Normal pulses. Heart sounds: Normal heart sounds. Pulmonary:  
   Breath sounds: Normal breath sounds. Comments: Tachypnea Crackles bilaterally Abdominal:  
   General: Abdomen is flat. Bowel sounds are normal. There is no distension. Palpations: Abdomen is soft. Musculoskeletal: Normal range of motion. Skin: 
   General: Skin is warm and dry. Capillary Refill: Capillary refill takes less than 2 seconds. Neurological:  
   Mental Status: She is disoriented. Comments: Moves all 4 extremities Diagnostic Study Results Labs - Recent Results (from the past 12 hour(s)) CBC WITH AUTOMATED DIFF Collection Time: 01/19/21 10:02 AM  
Result Value Ref Range WBC 13.5 (H) 3.6 - 11.0 K/uL  
 RBC 4.96 3.80 - 5.20 M/uL  
 HGB 14.1 11.5 - 16.0 g/dL HCT 45.2 35.0 - 47.0 %  MCV 91.1 80.0 - 99.0 FL  
 MCH 28.4 26.0 - 34.0 PG  
 MCHC 31.2 30.0 - 36.5 g/dL  
 RDW 13.4 11.5 - 14.5 % PLATELET 967 673 - 637 K/uL MPV 10.8 8.9 - 12.9 FL  
 NRBC 0.0 0  WBC ABSOLUTE NRBC 0.00 0.00 - 0.01 K/uL NEUTROPHILS 90 (H) 32 - 75 % LYMPHOCYTES 4 (L) 12 - 49 % MONOCYTES 5 5 - 13 % EOSINOPHILS 0 0 - 7 % BASOPHILS 0 0 - 1 % IMMATURE GRANULOCYTES 1 (H) 0.0 - 0.5 % ABS. NEUTROPHILS 12.2 (H) 1.8 - 8.0 K/UL  
 ABS. LYMPHOCYTES 0.5 (L) 0.8 - 3.5 K/UL  
 ABS. MONOCYTES 0.7 0.0 - 1.0 K/UL  
 ABS. EOSINOPHILS 0.0 0.0 - 0.4 K/UL  
 ABS. BASOPHILS 0.0 0.0 - 0.1 K/UL  
 ABS. IMM. GRANS. 0.1 (H) 0.00 - 0.04 K/UL  
 DF SMEAR SCANNED    
 RBC COMMENTS NORMOCYTIC, NORMOCHROMIC METABOLIC PANEL, COMPREHENSIVE Collection Time: 01/19/21 10:02 AM  
Result Value Ref Range Sodium 147 (H) 136 - 145 mmol/L Potassium 3.1 (L) 3.5 - 5.1 mmol/L Chloride 110 (H) 97 - 108 mmol/L  
 CO2 31 21 - 32 mmol/L Anion gap 6 5 - 15 mmol/L Glucose 101 (H) 65 - 100 mg/dL BUN 36 (H) 6 - 20 MG/DL Creatinine 1.21 (H) 0.55 - 1.02 MG/DL  
 BUN/Creatinine ratio 30 (H) 12 - 20 GFR est AA 51 (L) >60 ml/min/1.73m2 GFR est non-AA 42 (L) >60 ml/min/1.73m2 Calcium 9.6 8.5 - 10.1 MG/DL Bilirubin, total 0.7 0.2 - 1.0 MG/DL  
 ALT (SGPT) 15 12 - 78 U/L  
 AST (SGOT) 29 15 - 37 U/L Alk. phosphatase 121 (H) 45 - 117 U/L Protein, total 7.2 6.4 - 8.2 g/dL Albumin 2.7 (L) 3.5 - 5.0 g/dL Globulin 4.5 (H) 2.0 - 4.0 g/dL A-G Ratio 0.6 (L) 1.1 - 2.2 SAMPLES BEING HELD Collection Time: 01/19/21 10:02 AM  
Result Value Ref Range SAMPLES BEING HELD  LAV, RED, 2 BLUE   
 COMMENT Add-on orders for these samples will be processed based on acceptable specimen integrity and analyte stability, which may vary by analyte. TROPONIN I Collection Time: 01/19/21 10:02 AM  
Result Value Ref Range Troponin-I, Qt. <0.05 <0.05 ng/mL POC LACTIC ACID Collection Time: 01/19/21 11:12 AM  
Result Value Ref Range Lactic Acid (POC) 1.77 0.40 - 2.00 mmol/L  
SARS-COV-2 Collection Time: 01/19/21 11:58 AM  
Result Value Ref Range Specimen source Nasopharyngeal    
 Specimen source NP SWAB COVID-19 rapid test Detected (AA) NOTD Specimen type NP Swab Health status Symptomatic Testing POC EG7 Collection Time: 01/19/21 12:03 PM  
Result Value Ref Range Calcium, ionized (POC) 1.29 1.12 - 1.32 mmol/L  
 pH (POC) 7.44 7.35 - 7.45    
 pCO2 (POC) 39.3 35.0 - 45.0 MMHG  
 pO2 (POC) 61 (L) 80 - 100 MMHG  
 HCO3 (POC) 26.5 (H) 22 - 26 MMOL/L Base excess (POC) 2 mmol/L  
 sO2 (POC) 92 92 - 97 % Site RIGHT RADIAL Device: ROOM AIR Allens test (POC) N/A Specimen type (POC) ARTERIAL Total resp. rate 16 EKG, 12 LEAD, INITIAL Collection Time: 01/19/21 12:03 PM  
Result Value Ref Range Ventricular Rate 85 BPM  
 Atrial Rate 85 BPM  
 P-R Interval 136 ms QRS Duration 90 ms Q-T Interval 360 ms QTC Calculation (Bezet) 428 ms Calculated P Axis 52 degrees Calculated R Axis -60 degrees Calculated T Axis 104 degrees Diagnosis Sinus rhythm with occasional premature ventricular complexes Left axis deviation Left ventricular hypertrophy with repolarization abnormality Inferior infarct , age undetermined No previous ECGs available URINALYSIS W/ REFLEX CULTURE Collection Time: 01/19/21  2:01 PM  
 Specimen: Urine Result Value Ref Range Color YELLOW/STRAW Appearance CLEAR CLEAR Specific gravity 1.014 1.003 - 1.030    
 pH (UA) 5.5 5.0 - 8.0 Protein Negative NEG mg/dL Glucose Negative NEG mg/dL Ketone TRACE (A) NEG mg/dL Bilirubin Negative NEG Blood SMALL (A) NEG Urobilinogen 1.0 0.2 - 1.0 EU/dL Nitrites Negative NEG Leukocyte Esterase Negative NEG    
 WBC 0-4 0 - 4 /hpf  
 RBC 5-10 0 - 5 /hpf Epithelial cells FEW FEW /lpf  Bacteria Negative NEG /hpf  
 UA: UC IF INDICATED CULTURE NOT INDICATED BY UA RESULT CNI    
PROTHROMBIN TIME + INR Collection Time: 01/19/21  2:01 PM  
Result Value Ref Range INR 1.1 0.9 - 1.1 Prothrombin time 11.5 (H) 9.0 - 11.1 sec FIBRINOGEN Collection Time: 01/19/21  2:01 PM  
Result Value Ref Range Fibrinogen 291 200 - 475 mg/dL LD Collection Time: 01/19/21  2:01 PM  
Result Value Ref Range  (H) 81 - 246 U/L  
D DIMER Collection Time: 01/19/21  2:01 PM  
Result Value Ref Range D-dimer 11.24 (H) 0.00 - 0.65 mg/L FEU Radiologic Studies -  
CT HEAD WO CONT Final Result No apparent acute intracranial finding. XR CHEST PORT Final Result Patchy airspace disease in the left upper lobe and left lower lobe. CT Results  (Last 48 hours) 01/19/21 1231  CT HEAD WO CONT Final result Impression:  No apparent acute intracranial finding. Narrative:  EXAM: Head CT without Contrast:  
   
TECHNIQUE: Unenhanced CT Head is performed. CT dose reduction was achieved  
through use of a standardized protocol tailored for this examination and  
automatic exposure control for dose modulation. INDICATION:  AMS FINDINGS: There is no apparent mass, and no bleed, shift, obstructive  
hydrocephalus or significant extra-axial fluid collection. Regions of  
parenchymal hypodensity are present, nonspecific, but favoring chronic  
microangiopathy. Bone windows are unremarkable. CXR Results  (Last 48 hours) 01/19/21 1121  XR CHEST PORT Final result Impression:  Patchy airspace disease in the left upper lobe and left lower lobe. Narrative:  EXAM: XR CHEST PORT INDICATION: sob COMPARISON: None. FINDINGS: A portable AP radiograph of the chest was obtained at 1113 hours. The  
patient is on a cardiac monitor. There is airspace disease in the left lower lobe with elevation of left hemidiaphragm. There is patchy airspace disease in  
the left upper lobe. There is atherosclerosis of the aorta. There are old right  
rib fractures and degenerative changes of the shoulders. Medical Decision Making I am the first provider for this patient. I reviewed the vital signs, available nursing notes, past medical history, past surgical history, family history and social history. Vital Signs-Reviewed the patient's vital signs. Patient Vitals for the past 12 hrs: 
 Temp Pulse Resp BP SpO2  
01/19/21 1624     100 % 01/19/21 1530  83 26 (!) 181/83   
01/19/21 1445  76 20 (!) 160/83   
01/19/21 1400  85 23 (!) 138/94   
01/19/21 1315  77 20 131/61 95 % 01/19/21 0957     100 % 01/19/21 0954 99 °F (37.2 °C) 92 25 (!) 142/90  ECG Interpretation: sinus rhythm, rate 85, leftward deviated axis, normal intervals, no ischemic changes Records Reviewed: Nursing Notes and Old Medical Records Provider Notes (Medical Decision Making): DDx:  
Sepsis Altered mental status CVA Covid pneumonia Electrolyte abnormalities Dehydration Patient with unknown past medical history presents to the emergency department with known diagnosis of Covid from a nursing home. Here with altered mental status with unknown baseline. Will initiate sepsis work-up due to leukocytosis and laboratory evidence of patient appearing dry. IVF for tachycardia as part of sepsis workup. Doubt end-organ damage. CT head to ensure no ICH 
 
ED Course and Progress Notes:  
Initial assessment performed. The patients presenting problems have been discussed, and they are in agreement with the care plan formulated and outlined with them. I have encouraged them to ask questions as they arise throughout their visit. Re-eval pt given Vanc and Cefepime with findings of pneumonia - suspect superimposed bacterial infection after COVID. Pt also testing positive for COVID - with hypoxia also gave dexamethasone. Unable to contact family to confirm code status - she has previously been full code as documented by her nursing home. CT head negative for ICH. IV potassium given for hypokalemia. Critical Care: CRITICAL CARE NOTE : 
 
4:37 PM 
IMPENDING DETERIORATION -Respiratory ASSOCIATED RISK FACTORS - Dehydration MANAGEMENT- Bedside Assessment INTERPRETATION -  Blood Gases INTERVENTIONS - Applied O2, gave dexamethasone CASE REVIEW - Hospitalist 
TREATMENT RESPONSE -Stable PERFORMED BY - Self NOTES   : 
 
Susie Willard, have spent 45 minutes of critical care time involved in lab review, consultations with specialist, family decision- making, bedside attention and documentation. This time excludes time spent in any separate billed procedures. During this entire length of time I was immediately available to the patient. Diagnosis Clinical Impression: No diagnosis found. Disposition: 
Admission Resident Signature: Jas Sweeney MD, PGY4

## 2021-01-20 NOTE — ACP (ADVANCE CARE PLANNING)
Advance Care Planning Note NAME: Brianna Hernandez :  1929 MRN:  [id number] Date/Time:  2021 7:27 PM 
 
Active Diagnoses: 
Hospital Problems  Date Reviewed: 10/15/2020 Codes Class Noted POA Acute respiratory failure with hypoxia [facility]) ICD-10-CM: J96.01 
ICD-9-CM: 518.81  2021 Unknown Pneumonia due to COVID-19 virus ICD-10-CM: U07.1, J12.82 ICD-9-CM: 480.8  2021 Unknown Acute metabolic encephalopathy Hypernatremia, dehydration Thoracic progressive AAA  
CKD Mild dementia These active diagnoses are of sufficient risk that focused discussion on advance care planning is indicated in order to allow the patient to thoughtfully consider personal goals of care, and if situations arise that prevent the ability to personally give input, to ensure appropriate representation of their personal desires for different levels and aggressiveness of care. Discussion:  
Code status addressed with sister Brianna Nesbitt over phone cell [phone] due to visitor restriction during covid-19 pandemic. She reports having durable POA for patient. Patient unable to participate in conversation due to acute encephalopathy. Sister reports patient would not complete advance directive despite her urging, always had fighting spirit. We discussed her advance age, frailty, the fact that sister shared that patient's cardiologist in [address] [place] [address] reported her thoracic aortic aneurysm had doubled in size on last check within past 6 months and now with covid-19 infection that her chance of recovery if she were to require intubation is dismal.  Sister definitely against DNR and after our discussion, agreed for patient to be DNR/DNI, no central line or vasopressors, no feeding tube. Persons present and participating in discussion: Atiya Dejesus MD, Brianna Nesbitt Time Spent: Total time spent in education and discussion:   20  minutes.   
 
 
 
Dread Zee MD  
Hospitalist

## 2021-01-20 NOTE — PROGRESS NOTES
Pharmacy Automatic Renal Dosing Protocol - Antimicrobials Indication for Antimicrobials: CAP in setting of COVID Current Regimen of Each Antimicrobial: 
Cefepime 2g IV q 24h (Start Date ; Day # 2) Vancomycin 500mg IV q24h (start: , day ) Previous Antimicrobial Therapy: 
 
Vancomycin Trough Goal Level: 15 - 20 (AUC: 400 - 600 mg/hr/Liter/day) Date Dose & Interval Measured (mcg/mL) Extrapolated (mcg/mL)  
 14:30 500mg IV q24h Significant Cultures:  
 blood x 2 - GPC clusters - prelim Radiology / Imaging results: (X-ray, CT scan or MRI):  
: chest xray: Patchy airspace disease in the left upper lobe and left lower lobe. Paralysis, amputations, malnutrition: none Labs: 
Recent Labs  
  21 
0446 21 
1002 CREA 0.98 1.21* BUN 31* 36* WBC 11.2* 13.5* Temp (24hrs), Av.9 °F (36.6 °C), Min:97 °F (36.1 °C), Max:98.5 °F (36.9 °C) Is the Patient on Dialysis? No 
 
Creatinine Clearance (mL/min): Estimated Creatinine Clearance: 26.5 mL/min (based on SCr of 0.98 mg/dL). Estimated Creatinine Clearance (using IBW):26.9 mL/min Impression/Plan:  
SCr improved, WBC improved, afebrile Continue Cefepime Adjusted Vancomycin to 750mg IV q24h for a projected trough of 16.2 BMP and CBC already ordered daily Ordered Vancomycin trough before  15:00 dose Antimicrobial stop date 7 days Pharmacy will follow daily and adjust medications as appropriate for renal function and/or serum levels.  
 
Thank you, 
Manjinder Maurice, [facility]

## 2021-01-20 NOTE — ED NOTES
Bedside shift change report given to Leesa RN  (oncoming nurse) by Ze Boo RN  (offgoing nurse). Report included the following information SBAR, ED Summary, Procedure Summary, Intake/Output and MAR.

## 2021-01-20 NOTE — ED NOTES
Bedside shift change report given to Monisha Yeager RN (oncoming nurse) by Renetta Gr RN (offgoing nurse). Report included the following information SBAR, Kardex, ED Summary, [facility] and Recent Results. 10:50 PM: TRANSFER - OUT REPORT: 
 
Verbal report given to AI Ruiz(name) on Hannah Edgar  being transferred to Renal(unit) for routine progression of care Report consisted of patients Situation, Background, Assessment and  
Recommendations(SBAR). Information from the following report(s) SBAR, Kardex, ED Summary, [facility] and Recent Results was reviewed with the receiving nurse. Lines:  
Peripheral IV 01/19/21 Right Antecubital (Active) Site Assessment Clean, dry, & intact 01/19/21 6110 Phlebitis Assessment 0 01/19/21 0959 Infiltration Assessment 0 01/19/21 0959 Dressing Status Clean, dry, & intact 01/19/21 4071 Dressing Type Transparent 01/19/21 0959 Hub Color/Line Status Pink 01/19/21 0959 Peripheral IV 01/19/21 Left Hand (Active) Opportunity for questions and clarification was provided. Patient transported with: 
 [employer]

## 2021-01-20 NOTE — PROGRESS NOTES
Hospitalist Progress Note NAME: Adan Hogan :  1929 MRN:  [id number] Room Number:  7457/75  @ [facility] Summary: 80 y.o. female whom presented on 2021 with Assessment / Plan: Anticipated discharge date : pending clinical course Anticipated disposition : LTC Barriers to discharge : Hypoxia Acute hypoxic respiratory failure POA Sepsis POA due to bilateral COVID-19 pneumonia Rapid Covid test positive. On admission respiratory rate 26, WBC 13.5, chest x-ray reveals patchy airspace disease in left upper lobe and left lower lobe. Dexamethasone D2 Remdesivir D2 Zinc, ascorbic acid, on hold at this time as she is strict n.p.o. -Vanc, Cefepime - Add Flagyl for aspiration coverage. Droplet isolation. 
- Trend Inflammatory markers. Acute metabolic encephalopathy POA Mild dementia Multifactorial due to electrolyte abnormalities, sepsis, hypoxia. Frequent reorientation, adjust sleep-wake cycle, avoid delirium genic medications. Address underlying etiology. Dehydration POA, improving Likely due to decreased oral intake in the setting of acute illness. Continue D5 1/2NS infusion. Hypernatremia POA,resolved Due to decreased free water intake. Resolved with hydration. Oropharyngeal dysphagia POA Likely due to acute illness. Seen by speech therapy today Strict n.p.o. Tophaceous gout POA 
- continue allopurinol. On hold at this time due to NPO status. Systolic murmur Intrathoracic aortic aneurysm Echo ordered History of postmenopausal bleeding Has been evaluated by Gyn-Onc Dr. Prema Moeller who has recommended hysteroscopy. - Outpatient follow up. Code status: DNR Surrogate decision maker :  
Daughter Abdirizak Found [phone] Called daughter to give update, no response. Prophylaxis: Hep SQ Recommended Disposition: SNF/LTC Subjective: Chief Complaint / Reason for Physician Visit Confused, oriented to self only. Discussed with RN events overnight. Review of Systems: 
Not tolerating diet Unable to obtain ROS due to baseline dementia, current encephalopathy Objective: VITALS:  
Last 24hrs VS reviewed since prior progress note. Most recent are: 
Patient Vitals for the past 24 hrs: 
 Temp Pulse Resp BP SpO2  
01/20/21 1454 97.1 °F (36.2 °C) 79 16 (!) 131/91 96 % 01/20/21 1120  80     
01/20/21 1051 97.1 °F (36.2 °C) (!) 52 16 129/70 96 % 01/20/21 0847     92 % 01/20/21 0752 97 °F (36.1 °C) 71 18 116/73 (!) 88 % 01/20/21 0510 98 °F (36.7 °C) 78 19 (!) 173/51 93 % 01/20/21 0109 98.4 °F (36.9 °C) 63 18 (!) 147/75 94 % 01/20/21 0010 98.5 °F (36.9 °C) 91 18 (!) 194/85 94 % 01/19/21 2153 98.3 °F (36.8 °C) 85 23 (!) 150/104 99 % 01/19/21 2000  80 22 (!) 167/110 99 % Intake/Output Summary (Last 24 hours) at 1/20/2021 1643 Last data filed at 1/19/2021 1649 Gross per 24 hour Intake 100 ml Output  Net 100 ml PHYSICAL EXAM: 
General: Alert, cooperative, no acute distress EENT:  EOMI. Anicteric sclerae. MMM Resp:  CTA bilaterally, no wheezing or rales. No accessory muscle use CV:  Regular  rhythm,  normal L6/D9, holosystolic murmur, no rubs gallops, No edema GI:  Soft, Non distended, Non tender. +Bowel sounds Neurologic:  Alert and oriented X self only, normal speech. Psych:   Poor insight. Not anxious nor agitated Skin:  No rashes. No jaundice. Reviewed most current lab test results and cultures  YES Reviewed most current radiology test results   YES Review and summation of old records today    NO Reviewed patient's current orders and MAR    YES 
PMH/SH reviewed - no change compared to H&P 
________________________________________________________________________ Care Plan discussed with: 
  Comments Patient Family  x   
RN x Care Manager Consultant Multidiciplinary team rounds were held today with , nursing, pharmacist and clinical coordinator. Patient's plan of care was discussed; medications were reviewed and discharge planning was addressed. ________________________________________________________________________ Total NON critical care TIME:  35  Minutes Total CRITICAL CARE TIME Spent:   Minutes non procedure based Comments >50% of visit spent in counseling and coordination of care    
________________________________________________________________________ Demond Briones MD  
 
Procedures: see electronic medical records for all procedures/Xrays and details which were not copied into this note but were reviewed prior to creation of Plan. LABS: 
I reviewed today's most current labs and imaging studies. Pertinent labs include: 
Recent Labs  
  01/20/21 
0446 01/19/21 
1002 WBC 11.2* 13.5* HGB 12.0 14.1 HCT 38.4 45.2  249 Recent Labs  
  01/20/21 
0446 01/19/21 
1401 01/19/21 
1002   --  147* K 3.3*  --  3.1*  
*  --  110* CO2 28  --  31  
*  --  101* BUN 31*  --  36* CREA 0.98  --  1.21* CA 9.1  --  9.6 MG  --  2.3  --   
ALB 2.2*  --  2.7* TBILI 0.7  --  0.7 ALT 13  --  15 INR 1.2* 1.1  --   
 
 
Signed: Demond Briones MD

## 2021-01-20 NOTE — PROGRESS NOTES
Problem: Dysphagia (Adult) Goal: *Acute Goals and Plan of Care (Insert Text) Description: 1/20/2021 Speech path goals 1. Patient will participate with reeval of swallowing Not met SPEECH LANGUAGE PATHOLOGY BEDSIDE SWALLOW EVALUATION Patient: Hannah Edgar (27 y.o. female) Date: 1/20/2021 Primary Diagnosis: Pneumonia due to COVID-19 virus [U07.1, J12.82] Acute respiratory failure with hypoxia ([address] [J96.01] Precautions:     
 
ASSESSMENT : 
Based on the objective data described below, the patient presents with slow oral phase with slow mastication of ice and good oral clearance. Pharyngeal phase is characterized by suspected swallow delay, reduced hyolaryngeal excursion and 2-3 swallows per bolus. Suspect pharyngeal residue. She cleared her throat and coughed several times after the few pureed bolus'. Suspect laryngeal penetration and possible aspiration. Did not offer thins Patient will benefit from skilled intervention to address the above impairments. Patients rehabilitation potential is considered to be Fair PLAN : 
Recommendations and Planned Interventions: 
Recommend NPO Reeval tomorrow Frequency/Duration: Patient will be followed by speech-language pathology 3 times a week to address goals. Discharge Recommendations: None SUBJECTIVE:  
Patient stated Jolanta\". OBJECTIVE:  
 
Past Medical History:  
Diagnosis Date GERD (gastroesophageal reflux disease) HTN (hypertension) Hyperlipidemia Postmenopausal vaginal bleeding Tophaceous gout History reviewed. No pertinent surgical history. Prior Level of Function/Home Situation:  
  
Diet prior to admission: reg/thins Current Diet: NPO Cognitive and Communication Status: 
Neurologic State: Drowsy Orientation Level: Other (Comment) Cognition: No command following Perception: Appears intact Oral Assessment: 
Oral Assessment Labial: No impairment Lingual: Other (comment) Velum: Other (comment) Mandible: No impairment P.O. Trials: 
Patient Position: upright in bed Vocal quality prior to P.O.: No impairment Consistency Presented: Ice chips;Puree How Presented: SLP-fed/presented;Spoon Bolus Acceptance: No impairment Bolus Formation/Control: Impaired Type of Impairment: Delayed Propulsion: Delayed (# of seconds) Oral Residue: None Initiation of Swallow: Delayed (# of seconds) Laryngeal Elevation: Decreased Aspiration Signs/Symptoms: Weak cough;Clear throat Pharyngeal Phase Characteristics: Suspected pharyngeal residue Oral Phase Severity: Mild Pharyngeal Phase Severity : Moderate-severe NOMS:  
The NOMS functional outcome measure was used to quantify this patient's level of swallowing impairment. Based on the NOMS, the patient was determined to be at level 2 for swallow function NOMS Swallowing Levels: 
Level 1 (CN): NPO Level 2 (CM): NPO but takes consistency in therapy Level 3 (CL): Takes less than 50% of nutrition p.o. and continues with nonoral feedings; and/or safe with mod cues; and/or max diet restriction Level 4 (CK): Safe swallow but needs mod cues; and/or mod diet restriction; and/or still requires some nonoral feeding/supplements Level 5 (CJ): Safe swallow with min diet restriction; and/or needs min cues Level 6 (CI): Independent with p.o.; rare cues; usually self cues; may need to avoid some foods or needs extra time Level 7 (CH): Independent for all p.o. [place]. (2003). National Outcomes Measurement System (NOMS): Adult Speech-Language Pathology User's Guide. Pain: 
Pain Scale 1: Adult Nonverbal Pain Scale After treatment:  
Patient left in no apparent distress in bed COMMUNICATION/EDUCATION:  
Patient was educated regarding her deficit(s) of dysphagia The patient's plan of care including recommendations, planned interventions, and recommended diet changes were discussed with: Registered nurse. Patient is unable to participate in goal setting and plan of care. Thank you for this referral. 
Justina Leung, LYRIC Time Calculation: 10 mins

## 2021-01-20 NOTE — PROGRESS NOTES
End of Shift Note Bedside shift change report given to [address] (oncoming nurse) by Harsha Matt RN (offgoing nurse). Report included the following information SBAR, Kardex, Intake/Output, MAR, Accordion, Recent Results and Med Rec Status Shift worked:  7PM-7AM 
  
Shift summary and any significant changes: NPO- awaiting speech evaluation Concerns for physician to address:  As above Zone phone for oncoming shift:   [phone] Activity: 
Activity Level: Up with Assistance Number times ambulated in hallways past shift: 0 Number of times OOB to chair past shift: 0 Cardiac:  
Cardiac Monitoring: Yes Access:  
Current line(s): PIV Genitourinary:  
Urinary status: incontinent Respiratory:  
  
Chronic home O2 use?: NO Incentive spirometer at bedside: N/A 
  
GI: 
Last Bowel Movement Date: (unable to assess) Current diet:  DIET NPO Passing flatus: YES Tolerating current diet: NO 
  
 
Pain Management:  
Patient states pain is manageable on current regimen: N/A Skin: 
Jerson Score: 14 Interventions: turn team, float heels, increase time out of bed and foam dressing Patient Safety: 
Fall Score: Total Score: 3 Interventions: bed/chair alarm and gripper socks Length of Stay: 
Expected LOS: - - - Actual LOS: 1 Harsha Matt RN

## 2021-01-21 NOTE — PROGRESS NOTES
Hospitalist Progress Note NAME: Dianna Medellin :  1929 MRN:  [id number] Room Number:  1106/38  @ [facility] Summary: 80 y.o. female whom presented on 2021 with Assessment / Plan: 
 
Acute hypoxic respiratory failure POA Sepsis POA due to bilateral COVID-19 pneumonia CoNS bacterimia , most likely contaminat Rapid Covid test positive. On admission respiratory rate 26, WBC 13.5, chest x-ray reveals patchy airspace disease in left upper lobe and left lower lobe. Dexamethasone D3 Remdesivir D3 Zinc, ascorbic acid, on hold at this time as she is strict n.p.o. 
-s/p Vanc, c/w Cefepime and Flagyl for aspiration coverage. Droplet isolation. 
- Trend Inflammatory markers. Acute metabolic encephalopathy POA Mild dementia Dysphagia Multifactorial due to electrolyte abnormalities, sepsis, hypoxia. Frequent reorientation, adjust sleep-wake cycle, avoid delirium genic medications. Address underlying etiology. Dehydration POA, improving Likely due to decreased oral intake in the setting of acute illness. Continue D5 1/2NS infusion. Hypernatremia POA,resolved Due to decreased free water intake. Resolved with hydration. Oropharyngeal dysphagia POA Likely due to acute illness. Seen by speech therapy today Strict n.p.o. Tophaceous gout POA 
- continue allopurinol. On hold at this time due to NPO status. Systolic murmur Intrathoracic aortic aneurysm Echo ordered History of postmenopausal bleeding Has been evaluated by Gyn-Onc Dr. Андрей Quintanilla who has recommended hysteroscopy. - Outpatient follow up. Code status: DNR Surrogate decision maker :  
Sister Alize Colon [phone] 
updted sister on  Prophylaxis: Hep SQ Recommended Disposition: SNF/LTC Subjective: Chief Complaint / Reason for Physician Visit FU covid pneumonia Confused Review of Systems: Not tolerating diet Unable to obtain ROS due to baseline dementia, current encephalopathy Objective: VITALS:  
Last 24hrs VS reviewed since prior progress note. Most recent are: 
Patient Vitals for the past 24 hrs: 
 Temp Pulse Resp BP SpO2  
01/21/21 1514 97.2 °F (36.2 °C)  18 (!) 159/70   
01/21/21 1249 97.2 °F (36.2 °C) 60 20 (!) 183/104   
01/21/21 0900 96.8 °F (36 °C) 62 18 (!) 160/95 99 % 01/21/21 0304 97.4 °F (36.3 °C) 67 17 136/74 91 % 01/21/21 0029 97.1 °F (36.2 °C) (!) 59 17 117/72 96 % 01/20/21 1939 97.3 °F (36.3 °C) 80 17 126/84 95 % No intake or output data in the 24 hours ending 01/21/21 1745 PHYSICAL EXAM: 
General: Alert, cooperative, no acute distress EENT:  EOMI. Anicteric sclerae. MMM Resp:  CTA bilaterally, no wheezing or rales. No accessory muscle use CV:  Regular  rhythm,  normal I5/S5, holosystolic murmur, no rubs gallops, No edema GI:  Soft, Non distended, Non tender. +Bowel sounds Neurologic:  Alert and oriented X self only, normal speech. Psych:   Poor insight. Not anxious nor agitated Skin:  No rashes. No jaundice. Reviewed most current lab test results and cultures  YES Reviewed most current radiology test results   YES Review and summation of old records today    NO Reviewed patient's current orders and MAR    YES 
PMH/SH reviewed - no change compared to H&P 
________________________________________________________________________ Care Plan discussed with: 
  Comments Patient Family  x   
RN x Care Manager Consultant Multidiciplinary team rounds were held today with , nursing, pharmacist and clinical coordinator. Patient's plan of care was discussed; medications were reviewed and discharge planning was addressed. ________________________________________________________________________ Total NON critical care TIME:  35  Minutes Total CRITICAL CARE TIME Spent:   Minutes non procedure based Comments >50% of visit spent in counseling and coordination of care    
________________________________________________________________________ Aaron Draper MD  
 
Procedures: see electronic medical records for all procedures/Xrays and details which were not copied into this note but were reviewed prior to creation of Plan. LABS: 
I reviewed today's most current labs and imaging studies. Pertinent labs include: 
Recent Labs  
  01/21/21 
0110 01/20/21 
0446 01/19/21 
1002 WBC 17.7* 11.2* 13.5* HGB 11.9 12.0 14.1 HCT 38.2 38.4 45.2  233 249 Recent Labs  
  01/21/21 
0110 01/20/21 
0446 01/19/21 
1401 01/19/21 
1002  144  --  147* K 3.5 3.3*  --  3.1*  
* 111*  --  110* CO2 26 28  --  31  
* 149*  --  101* BUN 28* 31*  --  36* CREA 1.00 0.98  --  1.21* CA 9.3 9.1  --  9.6 MG 2.0  --  2.3  --   
PHOS 2.2*  --   --   --   
ALB 2.4* 2.2*  --  2.7* TBILI 0.8 0.7  --  0.7 ALT 15 13  --  15 INR 1.1 1.2* 1.1  --   
 
 
Signed: Aaron Draper MD

## 2021-01-21 NOTE — PROGRESS NOTES
Transition of Care Plan *Disposition: Return to [facility] *Transport at D/C: BLS via [facility] *DME: No DME needs identified at this time *OP F/U: Per facility *IMM letter Reason for Admission:   PNA, COVID 19, Acute Respiratory Failure with Hypoxia RUR Score:  17% Plan for utilizing home health:   No home health care needs identified. Pt will likely return to SNF. PCP:   First and Last name:  Dr. Felipe Dumont Name of Practice: [facility] Are you a current patient: Yes/No: yes Approximate date of last visit: Amrik Goldman unsure Can you participate in a virtual visit with your PCP: no 
                 
Current Advanced Directive/Advance Care Plan: Pts current code status is a DNR. Pts sister is considering changing code status back to Full Code. Attending physician notified to address with sister. Sister states pt has no children and spouse is .    
                      
Transition of Care Plan:    Pt will discharge back to [facility]. 
 
 Pt is a 79 YO, ,  female who was admitted to [facility] on 1/19/21 with the following diagnoses: PNA, COVID 19, Acute Respiratory Failure with Hypoxia. Pts home is [facility] half-way in [address] [place] [address]. Pt was recently [facility] to [facility] last Wednesday. Pt has Medicare and [facility] Medicaid and her prescriptions are covered. Pts sister is supportive and provides transportation to appointments when necessary. Pts pharmacy is through [facility] out of [place] though her current medications were being managed by [place]. Pts PCP, Dr. Rodrigo Roque, comes to [facility] and sees pt as needed. Pt needs assistance with her ADLs and IADLs and uses a walker for ambulation. Pt has used home health previously though pts sister is unsure of the company. Pt has previously been in [facility] but has not had IPR. Pt will likely be transported by [facility] via [facility] at discharge. Care Management Interventions PCP Verified by CM: Yes(Provider at [facility]) Mode of Transport at Discharge: [facility] Transition of Care Consult (CM Consult): Discharge Planning, SNF(pt will likely return to [place] [facility] ) Discharge Durable Medical Equipment: No(has a walker) Physical Therapy Consult: No 
Occupational Therapy Consult: No 
Speech Therapy Consult: Yes Current Support Network: Assisted Living, Nursing Facility(currently at SNF at [facility], resident at [facility]) Confirm Follow Up Transport: Other (see comment) Discharge Location Discharge Placement: Skilled nursing facility Annita Vaz, [address] [facility], Care Management 
[phone] Kassandra Mcintyre LMSW Care Manager [facility] 
[phone]

## 2021-01-21 NOTE — PROGRESS NOTES
End of Shift Note Bedside shift change report given to Andrew Mercedes RN (oncoming nurse) by Diana Shah RN (offgoing nurse). Report included the following information SBAR, Kardex, ED Summary, [facility] and Recent Results Shift worked:  7a-7p Shift summary and any significant changes:  
  L hand swelling from infiltrated IV. Concerns for physician to address:  none Zone phone for oncoming shift:   [phone] Activity: 
Activity Level: Up with Assistance Number times ambulated in hallways past shift: 0 Number of times OOB to chair past shift: 0 Cardiac:  
Cardiac Monitoring: Yes Access:  
Current line(s): PIV Genitourinary:  
Urinary status: incontinent Respiratory:  
O2 Device: Nasal cannula Chronic home O2 use?: NO Incentive spirometer at bedside: NO 
  
GI: 
Last Bowel Movement Date: (unable to assess) Current diet:  DIET NPO Passing flatus: NO Tolerating current diet: YES Pain Management:  
Patient states pain is manageable on current regimen: N/A Skin: 
Jerson Score: 14 Interventions: speciality bed, float heels and increase time out of bed Patient Safety: 
Fall Score: Total Score: 3 Interventions: bed/chair alarm and gripper socks High Fall Risk: Yes Length of Stay: 
Expected LOS: 5d 12h Actual LOS: 1 Diana Shah RN

## 2021-01-21 NOTE — PROGRESS NOTES
Pharmacy Automatic Renal Dosing Protocol - Antimicrobials Indication for Antimicrobials: CAP in setting of COVID Current Regimen of Each Antimicrobial: 
Cefepime 2g IV q 24h (Start Date ; Day # 3) Metronidazole 500 mg IV Q12H (Start  Day 2 Previous Antimicrobial Therapy: 
Vancomycin 500mg IV q24h (start: , day 2) Vancomycin Trough Goal Level: 15 - 20 (AUC: 400 - 600 mg/hr/Liter/day) Date Dose & Interval Measured (mcg/mL) Extrapolated (mcg/mL)  
 16:30 750mg IV q24h Significant Cultures:  
 blood  -  coag negative staph - prelim Radiology / Imaging results: (X-ray, CT scan or MRI):  
: chest xray: Patchy airspace disease in the left upper lobe and left lower lobe. Paralysis, amputations, malnutrition: none Labs: 
Recent Labs  
  21 
0110 21 
0446 21 
1002 CREA 1.00 0.98 1.21* BUN 28* 31* 36* WBC 17.7* 11.2* 13.5* Temp (24hrs), Av.2 °F (36.2 °C), Min:96.8 °F (36 °C), Max:97.4 °F (36.3 °C) Is the Patient on Dialysis? No 
 
Creatinine Clearance (mL/min): Estimated Creatinine Clearance: 26 mL/min (based on SCr of 1 mg/dL). Estimated Creatinine Clearance (using IBW):26.3 mL/min Impression/Plan: WBC elevated. Likely COVID related Continue Cefepime and Metronidazole Stop Vancomycin. Blood cx are coag negative staph . Likely contaminate. Antimicrobial stop date 7 days Pharmacy will follow daily and adjust medications as appropriate for renal function and/or serum levels. Thank you, Porter Alva, CYRILD

## 2021-01-21 NOTE — PROGRESS NOTES
End of Shift Note Bedside shift change report given to Gilford Reason (oncoming nurse) by Ena Quintana (offgoing nurse). Report included the following information SBAR, Kardex, Intake/Output, MAR, Accordion and Recent Results Shift worked:  [id number] Shift summary and any significant changes:  
  Pt still confused, would not answer orientation questions or follow commands. Will not keep tele leads or gown on. Concerns for physician to address:  none Zone phone for oncoming shift:    
  
 
Activity: 
Activity Level: Bed Rest 
Number times ambulated in hallways past shift: 0 Number of times OOB to chair past shift: 0 Cardiac:  
Cardiac Monitoring: Yes Access:  
Current line(s): PIV Genitourinary:  
Urinary status: incontinent Respiratory:  
O2 Device: Nasal cannula Chronic home O2 use?: NO Incentive spirometer at bedside: NO 
  
GI: 
Last Bowel Movement Date: (unable to assess) Current diet:  DIET NPO Passing flatus: YES Tolerating current diet: YES Pain Management:  
Patient states pain is manageable on current regimen: N/A Skin: 
Jerson Score: 14 Interventions: float heels Patient Safety: 
Fall Score: Total Score: 3 Interventions: gripper socks High Fall Risk: Yes Length of Stay: 
Expected LOS: 5d 12h Actual LOS: 2 Ena Quintana

## 2021-01-21 NOTE — PROGRESS NOTES
Problem: Dysphagia (Adult) Goal: *Acute Goals and Plan of Care (Insert Text) Description: 1/20/2021 Speech path goals 1. Patient will participate with reeval of swallowing. 2. Patient will tolerate meds in applesauce with no overt s/s of aspiration. 3. Patient will tolerate diet upgrade. 1/21/2021 1253 by Caridad Liz SLP Outcome: Progressing Towards Goal 
SPEECH LANGUAGE PATHOLOGY DYSPHAGIA TREATMENT Patient: Janine Mcmillan (51 y.o. female) Date: 1/21/2021 Diagnosis: Pneumonia due to COVID-19 virus [U07.1, J12.82] Acute respiratory failure with hypoxia ([facility]) [J96.01] <principal problem not specified> Precautions:    
 
ASSESSMENT: 
Patient was alert and appeared to tolerate purees with slow oral phase and two swallows per bolus suggestive of pharyngeal residue. The swallow was also audible. After several bolus' there was gentle throat clearing. She coughed immediately after the ice chip swallow; very slow oral phase. With nectar thick liquids via spoon, there were two swallows and again it was audible which could be due to increased effort. She is confused but verbalizing and interacting some. Her baseline diet was reg/thins prior to [facility]. PLAN: 
Recommendations and Planned Interventions: 
Recommend NPO except for meds crushed in applesauce. Patient continues to benefit from skilled intervention to address the above impairments. Continue treatment per established plan of care. Discharge Recommendations:  None SUBJECTIVE:  
Patient stated she did not really want to eat. OBJECTIVE:  
Cognitive and Communication Status: 
Neurologic State: Alert, Drowsy Orientation Level: Oriented to person Cognition: No command following Perception: Appears intact Dysphagia Treatment: 
Oral Assessment: P.O. Trials: 
Patient Position: upright in bed Vocal quality prior to P.O.: No impairment Consistency Presented: Ice chips; Nectar thick liquid;Puree How Presented: Self-fed/presented;Spoon;Cup/sip Bolus Acceptance: No impairment Bolus Formation/Control: Impaired Type of Impairment: Delayed;Premature spillage Propulsion: Delayed (# of seconds) Oral Residue: None Initiation of Swallow: Delayed (# of seconds) Laryngeal Elevation: Decreased Aspiration Signs/Symptoms: Clear throat Oral Phase Severity: Mild-moderate Pharyngeal Phase Severity : Moderate Pain: After treatment:  
Sitting in bed COMMUNICATION/EDUCATION:  
Patient was educated regarding her deficit(s) of dysphagia but she will not retain. The patient's plan of care including recommendations, planned interventions, and recommended diet changes were discussed with: Registered nurse. Bob Perdomo SLP Time Calculation: 10 mins

## 2021-01-22 PROBLEM — Z71.89 COUNSELING REGARDING ADVANCE CARE PLANNING AND GOALS OF CARE: Status: ACTIVE | Noted: 2021-01-01

## 2021-01-22 PROBLEM — R13.12 OROPHARYNGEAL DYSPHAGIA: Status: ACTIVE | Noted: 2021-01-01

## 2021-01-22 NOTE — PROGRESS NOTES
Hospitalist Progress Note NAME: Brianna Hernandez :  1929 MRN:  [id number] Room Number:  1866/72  @ [facility] Summary: 80 y.o. female whom presented on 2021 with Assessment / Plan: 
 
Acute hypoxic respiratory failure POA Sepsis POA due to bilateral COVID-19 pneumonia CoNS bacterimia , most likely contaminat Rapid Covid test positive. On admission respiratory rate 26, WBC 13.5, chest x-ray reveals patchy airspace disease in left upper lobe and left lower lobe. Dexamethasone D4 Remdesivir D4 Zinc, ascorbic acid, on hold at this time as she is strict n.p.o. 
-s/p Vanc, c/w Cefepime and Flagyl for aspiration coverage. Droplet isolation. 
- Trend Inflammatory markers. Acute metabolic encephalopathy POA Mild dementia Dysphagia Multifactorial due to electrolyte abnormalities, sepsis, hypoxia. Frequent reorientation, adjust sleep-wake cycle, avoid delirium genic medications. Address underlying etiology. Dehydration POA, improving Likely due to decreased oral intake in the setting of acute illness. Continue D5 1/2NS infusion. Hypernatremia POA,resolved Hypokalemia Due to decreased free water intake. Resolved with hydration. Add kcl in IVF Oropharyngeal dysphagia POA Likely due to acute illness. Seen by speech therapy today Cleared to have a diet by speech : pureed Tophaceous gout POA 
- continue allopurinol. On hold at this time due to NPO status. Systolic murmur Intrathoracic aortic aneurysm Echo ordered History of postmenopausal bleeding Has been evaluated by Gyn-Onc Dr. Melissa Vanessa who has recommended hysteroscopy. - Outpatient follow up. Code status: DNR Surrogate decision maker :  
Sister Brianna Nesbitt [phone] 
updted sister on  Prophylaxis: Hep SQ Recommended Disposition: SNF/LTC Subjective: Chief Complaint / Reason for Physician Visit FU covid pneumonia Confused Review of Systems: 
Not tolerating diet Unable to obtain ROS due to baseline dementia, current encephalopathy Objective: VITALS:  
Last 24hrs VS reviewed since prior progress note. Most recent are: 
Patient Vitals for the past 24 hrs: 
 Temp Pulse Resp BP SpO2  
01/22/21 1441 97 °F (36.1 °C) 75 18 (!) 129/58 94 % 01/22/21 1103 97.4 °F (36.3 °C) 66 20 138/76 97 % 01/22/21 0741 97 °F (36.1 °C) 87 18 (!) 170/99 91 % 01/22/21 0340 97.1 °F (36.2 °C) 97 17 120/89 90 % 01/22/21 0017 97.3 °F (36.3 °C) (!) 107 18 (!) 140/91 90 % 01/21/21 2154  83  (!) 148/75   
01/21/21 2011 97.1 °F (36.2 °C) 79 18 (!) 172/109 94 % No intake or output data in the 24 hours ending 01/22/21 1706 PHYSICAL EXAM: 
General: Alert, cooperative, no acute distress EENT:  EOMI. Anicteric sclerae. MMM Resp:  CTA bilaterally, no wheezing or rales. No accessory muscle use CV:  Regular  rhythm,  normal T9/V5, holosystolic murmur, no rubs gallops, No edema GI:  Soft, Non distended, Non tender. +Bowel sounds Neurologic:  Alert and oriented X self only, normal speech. Psych:   Poor insight. Not anxious nor agitated Skin:  No rashes. No jaundice. Reviewed most current lab test results and cultures  YES Reviewed most current radiology test results   YES Review and summation of old records today    NO Reviewed patient's current orders and MAR    YES 
PMH/SH reviewed - no change compared to H&P 
________________________________________________________________________ Care Plan discussed with: 
  Comments Patient Family  x   
RN x Care Manager Consultant     
                 x Multidiciplinary team rounds were held today with , nursing, pharmacist and clinical coordinator. Patient's plan of care was discussed; medications were reviewed and discharge planning was addressed. ________________________________________________________________________ Total NON critical care TIME:  35  Minutes Total CRITICAL CARE TIME Spent:   Minutes non procedure based Comments >50% of visit spent in counseling and coordination of care    
________________________________________________________________________ Aditya Arias MD  
 
Procedures: see electronic medical records for all procedures/Xrays and details which were not copied into this note but were reviewed prior to creation of Plan. LABS: 
I reviewed today's most current labs and imaging studies. Pertinent labs include: 
Recent Labs  
  01/22/21 
0351 01/21/21 
0110 01/20/21 
0446 WBC 16.2* 17.7* 11.2* HGB 12.8 11.9 12.0 HCT 40.2 38.2 38.4  264 233 Recent Labs  
  01/22/21 
0351 01/21/21 
0110 01/20/21 
0446  144 144  
K 3.1* 3.5 3.3*  
* 113* 111* CO2 24 26 28 * 137* 149* BUN 27* 28* 31* CREA 1.19* 1.00 0.98  
CA 9.2 9.3 9.1 MG  --  2.0  --   
PHOS  --  2.2*  --   
ALB 2.4* 2.4* 2.2* TBILI 0.6 0.8 0.7 ALT 18 15 13 INR 1.2* 1.1 1.2* Signed: Aditya Arias MD

## 2021-01-22 NOTE — PROGRESS NOTES
Spiritual Care Assessment/Progress Note [address] 
 
 
NAME: Valerie Peters      MRN: [id number] AGE: 80 y.o. SEX: female Restorationism Affiliation: Unknown Language: English  
 
1/22/2021     Total Time (in minutes): 5 Spiritual Assessment begun in [facility] 3 MED TELE through conversation with: 
  
    []Patient        [] Family    [] Friend(s) Reason for Consult: Palliative Care, Initial/Spiritual Assessment Spiritual beliefs: (Please include comment if needed) 
   [] Identifies with a leo tradition:     
   [] Supported by a leo community:        
   [] Claims no spiritual orientation:       
   [] Seeking spiritual identity:            
   [] Adheres to an individual form of spirituality:       
   [x] Not able to assess:                   
 
    
Identified resources for coping:  
   [] Prayer                           
   [] Music                  [] Guided Imagery 
   [] Family/friends                 [] Pet visits [] Devotional reading                         [x] Unknown 
   [] Other:                                          
 
 
Interventions offered during this visit: (See comments for more details) Plan of Care: 
 
 [] Support spiritual and/or cultural needs  
 [] Support AMD and/or advance care planning process    
 [] Support grieving process 
 [] Coordinate Rites and/or Rituals  
 [] Coordination with community clergy [] No spiritual needs identified at this time 
 [] Detailed Plan of Care below (See Comments)  [] Make referral to Music Therapy 
[] Make referral to Pet Therapy    
[] Make referral to Addiction services 
[] Make referral to [facility] 
[] Make referral to Spiritual Care Partner 
[] No future visits requested       
[] Follow up upon further referrals Comments: Attempted Palliative Initial Spiritual Assessment for this pt in [address] Pt was unable to be assessed at this time. No family/friends present at time of visit. Contact Spiritual Care Services for any spiritual or emotional support needs. Sandee Goldman MDiv. Staff  Request  Support/Spiritual Care Services via [address] [place] [address]

## 2021-01-22 NOTE — PROGRESS NOTES
Problem: Dysphagia (Adult) Goal: *Acute Goals and Plan of Care (Insert Text) Description: 1/20/2021 Speech path goals 1. Patient will participate with reeval of swallowing. 2. Patient will tolerate meds in applesauce with no overt s/s of aspiration. 3. Patient will tolerate diet upgrade. Outcome: Progressing Towards Goal 
  
SPEECH LANGUAGE PATHOLOGY DYSPHAGIA TREATMENT Patient: Wilmar Lara (55 y.o. female) Date: 1/22/2021 Diagnosis: Pneumonia due to COVID-19 virus [U07.1, J12.82] Acute respiratory failure with hypoxia ([facility]) [J96.01] <principal problem not specified> Precautions: Aspiration ASSESSMENT: 
Patient with inconsistent bolus acceptance. With ice chip trials patient demonstrated very slow oral manipulation and transit and suspected pharyngeal swallow delay. Patient demonstrated throat clearing 2 of 3 ice chip trials. Patient required multiple cues to accept thin liquids via straw. She demonstrated audible swallow, 2 swallows per bolus and delayed throat clear. Patient readily accepted purees and demonstrated slow oral manipulation and transit. Swallow again audible. Patient demonstrated delayed cough after all PO trials. Given bedside presentation feel patient remains safe to continue medication crushed in puree and pureed snacks with RN. PLAN: 
Recommendations and Planned Interventions: 
Medication crushed in puree May have occasional pureed snacks with nursing Patient continues to benefit from skilled intervention to address the above impairments. Continue treatment per established plan of care. Discharge Recommendations: To Be Determined SUBJECTIVE:  
Patient reports the applesauce tasted good. RN reports patient tolerating medication crushes in puree. OBJECTIVE:  
Cognitive and Communication Status: 
Neurologic State: Alert Orientation Level: Disoriented to place, Disoriented to situation, Disoriented to time Cognition: Decreased command following, Decreased attention/concentration Perception: Appears intact Dysphagia Treatment: 
Oral Assessment: P.O. Trials: 
Patient Position: Upright in bed, turned to right Vocal quality prior to P.O.: Low volume Consistency Presented: Ice chips; Thin liquid;Puree How Presented: SLP-fed/presented;Spoon;Straw Bolus Acceptance: Impaired Bolus Formation/Control: Impaired Type of Impairment: Delayed Propulsion: Delayed (# of seconds) Oral Residue: None Initiation of Swallow: Delayed (# of seconds) Aspiration Signs/Symptoms: Delayed cough/throat clear After treatment:  
Patient left in no apparent distress in bed, Call bell within reach, and Nursing notified COMMUNICATION/EDUCATION:  
Patient was educated regarding role of SLP and POC. Patient did not respond. The patient's plan of care including recommendations, planned interventions, and recommended diet changes were discussed with: Registered nurse. LYRIC Barreto Time Calculation: 15 mins

## 2021-01-22 NOTE — CONSULTS
Palliative Medicine Consult [place]: [phone] ([phone]) Patient Name: Ambika Winkler YOB: 1929 Date of Initial Consult: 1/21/2021 Reason for Consult: goals of care Requesting Provider: Vin Connell MD 
Primary Care Physician: Ashly White MD 
 
 SUMMARY:  
Ambika Winkler is a 80 y.o. with a past history of mild dementia hypertension hyperlipidemia, valvular heart disease, tophaceous gout, thoracic aortic aneurysm, who was admitted on 1/19/2021 from [facility] with a diagnosis of COVID-19. Current medical issues leading to Palliative Medicine involvement include: elderly, frail, COVID. Psychosocial: resides in assisted living, transferred to [facility] after diagnosed with COVID-19. Surrogate decision maker:  Sister Roberta Stallings [phone]. Pt needs assistance with her ADLs and IADLs and uses a walker for ambulation.  
 PALLIATIVE DIAGNOSES:  
1. SOB 2. Acute hypoxic respiratory failure:  CXR: Patchy airspace disease in the left upper lobe and left lower lobe 3. Acute metabolic encephalopathy in setting of mild dementia (HEAD CT neg) 4. Dehydration 5. Dysphagia 6. Goals of care PLAN:  
1. Prior to visit, I completed an extensive review of patient's medical records, including medical documentation, vital signs, MARs, and results of various labs and other diagnostics. I also spoke with patient's nurse [address] [place] [address]. RN reports pt confused, not able to participate in conversation. 2. Spoke with pt's sister/YaminiA Jim Gutierrez: updated her on pt's condition, confused, just starting on pureed food. Jim Gutierrez has not seen her sister for months because of COVID, sister was living in assisted living, Jim Gutierrez was not told she was moved to [facility] until much later after the move. We discussed hoping for the best, pt could either go back to halfway or SNF or rehab, and she would prefer [facility] and not [facility]. We also discussed if pt got worse, recommended pt not be intubated as she would not survive. Jim Gutierrez questioned the decision to make pt DNR, counseled Jim Gutierrez that at age 80 and her frailty CPR at end of life would never be a good idea. Offered to assist with ZOOM visit. Her niece will be available maybe later next week to assist her with this. 3. Will call Jim Gutierrez again next Wed to reassess pt's condition and set up ZOOM visit. 4. Initial consult note routed to primary continuity provider and/or primary health care team members 5. Communicated plan of care with: Palliative Jerad WHALEY 192 Team 
 
 GOALS OF CARE / TREATMENT PREFERENCES:  
 
GOALS OF CARE: 
Patient/Health Care Proxy Stated Goals: Prolong life TREATMENT PREFERENCES:  
Code Status: DNR Advance Care Planning: 
[] [facility] Interdisciplinary Team has updated the ACP Navigator with [employer] and Patient Capacity Advance Care Planning 1/22/2021 Patient's Healthcare Decision Maker is: Legal Next of Kin Confirm Advance Directive Yes, not on file Medical Interventions: Limited additional interventions Other: As far as possible, the palliative care team has discussed with patient / health care proxy about goals of care / treatment preferences for patient. HISTORY:  
 
History obtained from: chart, sister CHIEF COMPLAINT: AMS 
 
HPI/SUBJECTIVE: The patient is:  
[] Verbal and participatory [x] Non-participatory due to: condition 1/19: BIBA from SNF for evaluation of worsening AMS, ypoxia with sats 75% on RA, 80% on 4L 02, 95% on NRB. She was weaned off 02 in the ED. CXR revealed patchy airspace disease in upper left and lower left lobe. Clinical Pain Assessment (nonverbal scale for severity on nonverbal patients):  
Clinical Pain Assessment Severity: 0 Activity (Movement): Lying quietly, normal position Duration: for how long has pt been experiencing pain (e.g., 2 days, 1 month, years) Frequency: how often pain is an issue (e.g., several times per day, once every few days, constant) FUNCTIONAL ASSESSMENT:  
 
Palliative Performance Scale (PPS): PPS: 20 
 
 
 PSYCHOSOCIAL/SPIRITUAL SCREENING:  
 
Palliative IDT has assessed this patient for cultural preferences / practices and a referral made as appropriate to needs (Cultural Services, Patient Advocacy, Ethics, etc.) Any spiritual / Jehovah's witness concerns: 
[] Yes /  [x] No 
 
Caregiver Burnout: 
[] Yes /  [x] No /  [] No Caregiver Present Anticipatory grief assessment:  
[x] Normal  / [] Maladaptive ESAS Anxiety: Anxiety: 0 
 
ESAS Depression:   unable to assess due to pt factors REVIEW OF SYSTEMS:  
 
Positive and pertinent negative findings in ROS are noted above in HPI. The following systems were [x] reviewed / [] unable to be reviewed as noted in HPI Other findings are noted below. Systems: constitutional, ears/nose/mouth/throat, respiratory, gastrointestinal, genitourinary, musculoskeletal, integumentary, neurologic, psychiatric, endocrine. Positive findings noted below. Modified ESAS Completed by: provider Pain: 0 Anxiety: 0 Dyspnea: 0 PHYSICAL EXAM:  
 
From RN flowsheet: 
Wt Readings from Last 3 Encounters:  
01/22/21 119 lb 14.9 oz (54.4 kg) 10/15/20 108 lb (49 kg) Blood pressure (!) 129/58, pulse 75, temperature 97 °F (36.1 °C), resp. rate 18, height 4' 11.84\" (1.52 m), weight 119 lb 14.9 oz (54.4 kg), SpO2 94 %. Pain Scale 1: Adult Nonverbal Pain Scale Pain Intensity 1: 0 Last bowel movement, if known:  
 
Constitutional: sitting quietly in bed In order to limit the exposure risk from COVID 19 and to preserve the hospital's limited supply of PPEs, seen through  window. Intubated. I spoke with her nurse. HISTORY:  
 
Active Problems: 
  Acute respiratory failure with hypoxia ([facility] (1/19/2021) Pneumonia due to COVID-19 virus (1/19/2021) Severe sepsis ([facility] (1/19/2021) SNEHAL (acute kidney injury) ([facility] (1/19/2021) Metabolic encephalopathy (7/26/5570) Past Medical History:  
Diagnosis Date  GERD (gastroesophageal reflux disease)  HTN (hypertension)  Hyperlipidemia  Postmenopausal vaginal bleeding  Tophaceous gout History reviewed. No pertinent surgical history. Family History Family history unknown: Yes History reviewed, no pertinent family history. Social History Tobacco Use  Smoking status: Never Smoker  Smokeless tobacco: Never Used Substance Use Topics  Alcohol use: Not on file Allergies Allergen Reactions  Nsaids (Non-Steroidal Anti-Inflammatory Drug) Unknown (comments)  Penicillins Unknown (comments)  Sulfa (Sulfonamide Antibiotics) Unknown (comments)  Sulfasalazine Unknown (comments) Current Facility-Administered Medications Medication Dose Route Frequency  dextrose 5% - 0.45% NaCl with KCl 40 mEq/L infusion   IntraVENous CONTINUOUS  
 hydrALAZINE (APRESOLINE) 20 mg/mL injection 10 mg  10 mg IntraVENous Q6H PRN  
 metroNIDAZOLE (FLAGYL) IVPB premix 500 mg  500 mg IntraVENous Q12H  
 remdesivir 100 mg in 0.9% sodium chloride 250 mL IVPB  100 mg IntraVENous Q24H  
 sodium chloride (NS) flush 5-40 mL  5-40 mL IntraVENous Q8H  
  sodium chloride (NS) flush 5-40 mL  5-40 mL IntraVENous PRN  
 acetaminophen (TYLENOL) tablet 650 mg  650 mg Oral Q6H PRN Or  
 acetaminophen (TYLENOL) suppository 650 mg  650 mg Rectal Q6H PRN  polyethylene glycol (MIRALAX) packet 17 g  17 g Oral DAILY PRN  promethazine (PHENERGAN) tablet 12.5 mg  12.5 mg Oral Q6H PRN Or  
 ondansetron (ZOFRAN) injection 4 mg  4 mg IntraVENous Q6H PRN  
 acetaminophen (TYLENOL) tablet 650 mg  650 mg Oral Q6H PRN Or  
 acetaminophen (TYLENOL) suppository 650 mg  650 mg Rectal Q6H PRN  
 guaiFENesin-dextromethorphan (ROBITUSSIN DM) 100-10 mg/5 mL syrup 5 mL  5 mL Oral Q4H PRN  
 [Held by provider] cholecalciferol (VITAMIN D3) (1000 Units /25 mcg) tablet 2 Tab  2,000 Units Oral DAILY  heparin (porcine) injection 5,000 Units  5,000 Units SubCUTAneous Q8H  
 [Held by provider] allopurinoL (ZYLOPRIM) tablet 100 mg  100 mg Oral DAILY  [Held by provider] ascorbic acid (vitamin C) (VITAMIN C) tablet 500 mg  500 mg Oral DAILY  [Held by provider] atorvastatin (LIPITOR) tablet 10 mg  10 mg Oral QHS  [Held by provider] folic acid (FOLVITE) tablet 1 mg  1 mg Oral DAILY  [Held by provider] pantoprazole (PROTONIX) tablet 40 mg  40 mg Oral ACB  [Held by provider] zinc sulfate (ZINCATE) 220 (50) mg capsule 1 Cap  1 Cap Oral DAILY  [Held by provider] docusate sodium (COLACE) capsule 100 mg  100 mg Oral BID  dexamethasone (DECADRON) 4 mg/mL injection 6 mg  6 mg IntraVENous DAILY  cefepime (MAXIPIME) 2 g in 0.9% sodium chloride (MBP/ADV) 100 mL MBP  2 g IntraVENous Q24H  
 insulin lispro (HUMALOG) injection   SubCUTAneous Q6H  
 glucose chewable tablet 16 g  4 Tab Oral PRN  
 dextrose (D50W) injection syrg 12.5-25 g  12.5-25 g IntraVENous PRN  
 glucagon (GLUCAGEN) injection 1 mg  1 mg IntraMUSCular PRN  pantoprazole (PROTONIX) 40 mg in 0.9% sodium chloride 10 mL injection  40 mg IntraVENous DAILY  
 
 
 
 LAB AND IMAGING FINDINGS:  
 
 Lab Results Component Value Date/Time WBC 16.2 (H) 01/22/2021 03:51 AM  
 HGB 12.8 01/22/2021 03:51 AM  
 PLATELET 790 78/70/6896 03:51 AM  
 
Lab Results Component Value Date/Time Sodium 143 01/22/2021 03:51 AM  
 Potassium 3.1 (L) 01/22/2021 03:51 AM  
 Chloride 112 (H) 01/22/2021 03:51 AM  
 CO2 24 01/22/2021 03:51 AM  
 BUN 27 (H) 01/22/2021 03:51 AM  
 Creatinine 1.19 (H) 01/22/2021 03:51 AM  
 Calcium 9.2 01/22/2021 03:51 AM  
 Magnesium 2.0 01/21/2021 01:10 AM  
 Phosphorus 2.2 (L) 01/21/2021 01:10 AM  
  
Lab Results Component Value Date/Time Alk. phosphatase 104 01/22/2021 03:51 AM  
 Protein, total 6.3 (L) 01/22/2021 03:51 AM  
 Albumin 2.4 (L) 01/22/2021 03:51 AM  
 Globulin 3.9 01/22/2021 03:51 AM  
 
Lab Results Component Value Date/Time INR 1.2 (H) 01/22/2021 03:51 AM  
 Prothrombin time 12.3 (H) 01/22/2021 03:51 AM  
  
Lab Results Component Value Date/Time Ferritin 122 01/21/2021 04:42 AM  
  
No results found for: PH, PCO2, PO2 No components found for: [place] No results found for: CPK, CKMB Total time:  
Counseling / coordination time, spent as noted above:  
> 50% counseling / coordination?:  
 
Prolonged service was provided for  []30 min   []75 min in face to face time in the presence of the patient, spent as noted above. Time Start:  
Time End:  
Note: this can only be billed with 11479 (initial) or 30443 (follow up). If multiple start / stop times, list each separately.

## 2021-01-22 NOTE — PROGRESS NOTES
Received notification from bedside RN about patient with regards to: K+ 3.1, needs order to replete VS: /89, HR 97, RR 17, O2 sat 90% on NC 2 L Intervention given: 10 meq KCl x 6 doses ordered

## 2021-01-22 NOTE — PROGRESS NOTES
Bedside shift change report given to Delores Cifuentes RN (oncoming nurse) by Gustabo Oliva RN (offgoing nurse). Report included the following information SBAR, Kardex, ED Summary, Intake/Output, MAR and Recent Results.
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
## 2021-01-23 NOTE — PROGRESS NOTES
Verbal shift change report given to Roxana Chapa (oncoming nurse) by Gustabo Oliva RN (offgoing nurse). Report included the following information SBAR, Kardex, ED Summary, OR Summary, MAR and Recent Results.

## 2021-01-23 NOTE — PROGRESS NOTES
Pharmacy Automatic Renal Dosing Protocol - Antimicrobials Indication for Antimicrobials: CAP in setting of COVID Current Regimen of Each Antimicrobial: 
Cefepime 2g IV q 24h (Start Date ; Day # 5) Metronidazole 500 mg IV Q12H (Start  Day 4 Previous Antimicrobial Therapy: 
Vancomycin 500mg IV q24h (start: , day 2) Vancomycin Trough Goal Level: 15 - 20 (AUC: 400 - 600 mg/hr/Liter/day) Date Dose & Interval Measured (mcg/mL) Extrapolated (mcg/mL)  
 16:30 750mg IV q24h Significant Cultures:  
 blood  -  coag negative staph - prelim Radiology / Imaging results: (X-ray, CT scan or MRI):  
: chest xray: Patchy airspace disease in the left upper lobe and left lower lobe. Paralysis, amputations, malnutrition: none Labs: 
Recent Labs  
  21 
0351 21 
0110 21 
0446 CREA 1.19* 1.00 0.98  
BUN 27* 28* 31* WBC 16.2* 17.7* 11.2* Temp (24hrs), Av.2 °F (36.2 °C), Min:97 °F (36.1 °C), Max:97.4 °F (36.3 °C) Is the Patient on Dialysis? No 
 
Creatinine Clearance (mL/min): Estimated Creatinine Clearance: 24.4 mL/min (A) (based on SCr of 1.19 mg/dL (H)). Estimated Creatinine Clearance (using IBW):22.1 mL/min Impression/Plan: WBC elevated. Likely COVID related Continue Cefepime and Metronidazole Antimicrobial stop date 7 days Pharmacy will follow daily and adjust medications as appropriate for renal function and/or serum levels. Thank you, Shaggy Campos, CYRILD

## 2021-01-23 NOTE — PROGRESS NOTES
Hospitalist Progress Note NAME: Vamsi Mckenzie :  1929 MRN:  [id number] Room Number:  5447/09  @ [facility] Summary: 80 y.o. female whom presented on 2021 with Assessment / Plan: 
 
Acute hypoxic respiratory failure POA Sepsis POA due to bilateral COVID-19 pneumonia CoNS bacterimia , most likely contaminat Rapid Covid test positive. On admission respiratory rate 26, WBC 13.5, chest x-ray reveals patchy airspace disease in left upper lobe and left lower lobe. Dexamethasone D5 Remdesivir D5 Zinc, ascorbic acid, on hold at this time as she is strict n.p.o. 
-s/p Vanc, c/w Cefepime and Flagyl for aspiration coverage. Droplet isolation. 
- Trend Inflammatory markers. Acute metabolic encephalopathy POA Mild dementia Dysphagia Multifactorial due to electrolyte abnormalities, sepsis, hypoxia. Frequent reorientation, adjust sleep-wake cycle, avoid delirium genic medications. Address underlying etiology. Dehydration POA, improving Likely due to decreased oral intake in the setting of acute illness. Continue D5 1/2NS infusion. Hypernatremia POA,resolved Hypokalemia Due to decreased free water intake. Resolved with hydration. Add kcl in IVF Oropharyngeal dysphagia POA Likely due to acute illness. Seen by speech therapy today Cleared to have a diet by speech : pureed snacks and meds in pureed Tophaceous gout POA 
- continue allopurinol. On hold at this time due to NPO status. Systolic murmur Intrathoracic aortic aneurysm Echo ordered History of postmenopausal bleeding Has been evaluated by Gyn-Onc Dr. Regulo Corado who has recommended hysteroscopy. - Outpatient follow up. Code status: DNR Surrogate decision maker :  
Sister Samuel Spar [phone] 
updted sister on  Prophylaxis: Hep SQ Recommended Disposition: SNF/LTC Subjective: Chief Complaint / Reason for Physician Visit FU covid pneumonia Confused Review of Systems: Tolerating diet Unable to obtain ROS due to baseline dementia, current encephalopathy Objective: VITALS:  
Last 24hrs VS reviewed since prior progress note. Most recent are: 
Patient Vitals for the past 24 hrs: 
 Temp Pulse Resp BP SpO2  
01/23/21 1435 97.2 °F (36.2 °C) 65 18 (!) 110/91 93 % 01/23/21 0953 (!) 96 °F (35.6 °C) 69 18 (!) 163/91 91 % 01/23/21 0723 97 °F (36.1 °C) 78 17  94 % 01/23/21 0257 97.5 °F (36.4 °C) 88 17 (!) 188/95 94 % 01/22/21 2334 97.6 °F (36.4 °C) 72 18 (!) 179/70 91 % 01/22/21 1954 97.7 °F (36.5 °C) 84 18 (!) 146/72 91 % Intake/Output Summary (Last 24 hours) at 1/23/2021 1758 Last data filed at 1/23/2021 1750 Gross per 24 hour Intake 1200 ml Output  Net 1200 ml PHYSICAL EXAM: 
General: Alert, cooperative, no acute distress EENT:  EOMI. Anicteric sclerae. MMM Resp:  CTA bilaterally, no wheezing or rales. No accessory muscle use CV:  Regular  rhythm,  normal I4/P6, holosystolic murmur, no rubs gallops, No edema GI:  Soft, Non distended, Non tender. +Bowel sounds Neurologic:  Alert and oriented X self only, normal speech. Psych:   Poor insight. Not anxious nor agitated Skin:  No rashes. No jaundice. Reviewed most current lab test results and cultures  YES Reviewed most current radiology test results   YES Review and summation of old records today    NO Reviewed patient's current orders and MAR    YES 
PMH/SH reviewed - no change compared to H&P 
________________________________________________________________________ Care Plan discussed with: 
  Comments Patient Family  x   
RN x Care Manager Consultant x Multidiciplinary team rounds were held today with , nursing, pharmacist and clinical coordinator. Patient's plan of care was discussed; medications were reviewed and discharge planning was addressed. ________________________________________________________________________ Total NON critical care TIME:  35  Minutes Total CRITICAL CARE TIME Spent:   Minutes non procedure based Comments >50% of visit spent in counseling and coordination of care    
________________________________________________________________________ Elizabeth Garrett MD  
 
Procedures: see electronic medical records for all procedures/Xrays and details which were not copied into this note but were reviewed prior to creation of Plan. LABS: 
I reviewed today's most current labs and imaging studies. Pertinent labs include: 
Recent Labs  
  01/23/21 
0231 01/22/21 
0351 01/21/21 
0110 WBC 11.2* 16.2* 17.7* HGB 13.5 12.8 11.9 HCT 42.2 40.2 38.2  288 264 Recent Labs  
  01/23/21 
0231 01/22/21 
0351 01/21/21 
0110  143 144  
K 4.0 3.1* 3.5 * 112* 113* CO2 20* 24 26 * 118* 137* BUN 24* 27* 28* CREA 1.21* 1.19* 1.00  
CA 9.2 9.2 9.3 MG 2.0  --  2.0 PHOS  --   --  2.2* ALB 2.5* 2.4* 2.4* TBILI 0.8 0.6 0.8 ALT 26 18 15 INR 1.3* 1.2* 1.1 Signed: Elizabeth Garrett MD

## 2021-01-23 NOTE — PROGRESS NOTES
End of Shift Note Bedside shift change report given to Margo Nicole (oncoming nurse) by Dayna Martin (offgoing nurse). Report included the following information SBAR, Kardex, Intake/Output, MAR and Recent Results Shift worked:  7p-7a Shift summary and any significant changes:  
  Pt converted into Afib and then back in to normal sinus. Heart rate maxed out at 150 during instance of Afib. Concerns for physician to address:  New a fib? Zone phone for oncoming shift:   [phone] Activity: 
Activity Level: Bed Rest 
Number times ambulated in hallways past shift: 0 Number of times OOB to chair past shift: 0 Cardiac:  
Cardiac Monitoring: Yes     
Cardiac Rhythm: Normal sinus rhythm, Atrial fibrillation Access:  
Current line(s): PIV Genitourinary:  
Urinary status: incontinent Respiratory:  
O2 Device: Nasal cannula Chronic home O2 use?: NO Incentive spirometer at bedside: NO 
  
GI: 
Last Bowel Movement Date: (unable to assess) Current diet:  DIET NPO Except Meds Passing flatus: YES Tolerating current diet: YES Pain Management:  
Patient states pain is manageable on current regimen: N/A Skin: 
Jerson Score: 11 Interventions: speciality bed and float heels Patient Safety: 
Fall Score: Total Score: 3 Interventions: bed/chair alarm High Fall Risk: Yes Length of Stay: 
Expected LOS: 4d 19h Actual LOS: 4 Dayna Martin

## 2021-01-24 NOTE — PROGRESS NOTES
1153-Unable to give pt's morning PO medications, as pt is lethargic with decreased command following. MD notified. 
 
1337-call from tele stating pt HR dropped to 40's and is now back to baseline in 80's. Discussed with Dr. Guerrero EKG completed and transmitted, artifact noted as pt is having tremors. 
 
1900-Verbal shift change report given to AI Ward (oncoming nurse) by Sandee Peña RN(offgoing nurse). Report included the following information SBAR, Kardex, ED Summary, Intake/Output, MAR and Recent Results.

## 2021-01-24 NOTE — PROGRESS NOTES
Hospitalist Progress Note NAME: Dianna Medellin :  1929 MRN:  [id number] Room Number:  6853/45  @ [facility] Summary: 80 y.o. female whom presented on 2021 with Assessment / Plan: 
 
Acute hypoxic respiratory failure POA Sepsis POA due to bilateral COVID-19 pneumonia CoNS bacterimia , most likely contaminat Rapid Covid test positive. On admission respiratory rate 26, WBC 13.5, chest x-ray reveals patchy airspace disease in left upper lobe and left lower lobe. Dexamethasone D5 S/p Remdesivir Zinc, ascorbic acid, on hold at this time as she is strict n.p.o. 
-s/p Vanc, c/w Cefepime and Flagyl for aspiration coverage. Droplet isolation. 
- Trend Inflammatory markers. Acute metabolic encephalopathy POA Mild dementia Dysphagia Multifactorial due to electrolyte abnormalities, sepsis, hypoxia. Frequent reorientation, adjust sleep-wake cycle, avoid delirium genic medications. Address underlying etiology. Dehydration POA, improving Likely due to decreased oral intake in the setting of acute illness. Continue D5 NS infusion. Hypernatremia POA,resolved Hyper kalemia Due to decreased free water intake. Resolved with hydration. k elevated 5.2 and creat , changed IVF to D5NS Unable to take veltassa as too desoriented Will repeat BMP Oropharyngeal dysphagia POA Likely due to acute illness. Seen by speech therapy Cleared to have a diet by speech : pureed snacks and meds in pureed Palliative on board Tophaceous gout POA 
- continue allopurinol. On hold at this time due to NPO status. Systolic murmur Intrathoracic aortic aneurysm Echo ordered History of postmenopausal bleeding Has been evaluated by Gyn-Onc Dr. Андрей Quintanilla who has recommended hysteroscopy. - Outpatient follow up. Code status: DNR Surrogate decision maker :  
Sister Alize Colon [phone] updted sister on 01/21, 01/24 Sister interested in hospice discussion Prophylaxis: lovenox Recommended Disposition: SNF/LTC Subjective: Chief Complaint / Reason for Physician Visit FU covid pneumonia Confused Review of Systems: Tolerating diet Unable to obtain ROS due to baseline dementia, current encephalopathy Objective: VITALS:  
Last 24hrs VS reviewed since prior progress note. Most recent are: 
Patient Vitals for the past 24 hrs: 
 Temp Pulse Resp BP SpO2  
01/24/21 0718 97.9 °F (36.6 °C) 85 20 (!) 159/92 94 % 01/24/21 0236 97.8 °F (36.6 °C) 93 20 (!) 95/56 95 % 01/23/21 2212 97.8 °F (36.6 °C) 98 18 123/88 93 % 01/23/21 1939 98.3 °F (36.8 °C) 83 19 (!) 160/104 92 % 01/23/21 1435 97.2 °F (36.2 °C) 65 18 (!) 110/91 93 % 01/23/21 0953 (!) 96 °F (35.6 °C) 69 18 (!) 163/91 91 % Intake/Output Summary (Last 24 hours) at 1/24/2021 0932 Last data filed at 1/24/2021 5849 Gross per 24 hour Intake 0 ml Output  Net 0 ml PHYSICAL EXAM: 
General: Alert, cooperative, no acute distress EENT:  EOMI. Anicteric sclerae. MMM Resp:  CTA bilaterally, no wheezing or rales. No accessory muscle use CV:  Regular  rhythm,  normal X4/Y7, holosystolic murmur, no rubs gallops, No edema GI:  Soft, Non distended, Non tender. +Bowel sounds Neurologic:  Alert and oriented X self only, normal speech. Psych:   Poor insight. Not anxious nor agitated Skin:  No rashes. No jaundice. Reviewed most current lab test results and cultures  YES Reviewed most current radiology test results   YES Review and summation of old records today    NO Reviewed patient's current orders and MAR    YES 
PMH/SH reviewed - no change compared to H&P 
________________________________________________________________________ Care Plan discussed with: 
  Comments Patient Family RN x Care Manager Consultant x Multidiciplinary team rounds were held today with , nursing, pharmacist and clinical coordinator. Patient's plan of care was discussed; medications were reviewed and discharge planning was addressed. ________________________________________________________________________ Total NON critical care TIME:  35  Minutes Total CRITICAL CARE TIME Spent:   Minutes non procedure based Comments >50% of visit spent in counseling and coordination of care    
________________________________________________________________________ Alton Joy MD  
 
Procedures: see electronic medical records for all procedures/Xrays and details which were not copied into this note but were reviewed prior to creation of Plan. LABS: 
I reviewed today's most current labs and imaging studies. Pertinent labs include: 
Recent Labs  
  01/24/21 
0046 01/23/21 
0231 01/22/21 
0351 WBC 13.4* 11.2* 16.2* HGB 12.6 13.5 12.8 HCT 40.4 42.2 40.2  339 288 Recent Labs  
  01/24/21 
0046 01/23/21 
0231 01/22/21 
0351  142 143  
K 5.2* 4.0 3.1*  
* 115* 112* CO2 18* 20* 24 * 153* 118* BUN 25* 24* 27* CREA 1.34* 1.21* 1.19* CA 9.0 9.2 9.2 MG  --  2.0  --   
ALB  --  2.5* 2.4* TBILI  --  0.8 0.6 ALT  --  26 18 INR 1.4* 1.3* 1.2* Signed: Alton Joy MD

## 2021-01-24 NOTE — PROGRESS NOTES
Received notification from bedside RN about patient with regards to: K+ 5.2, still has K+ in IVF 
VS: BP 95/56, HR 93, RR 20, O2 sat 95% Intervention given: changed IVF to plain D51/2 NS at same rate

## 2021-01-24 NOTE — PROGRESS NOTES
End of Shift Note Bedside shift change report given to Jeromy Oliver (oncoming nurse) by Mariano Rich (offgoing nurse). Report included the following information SBAR, Kardex, Intake/Output, MAR and Recent Results Shift worked:  7p-7a Shift summary and any significant changes:  
  Pt had increased potassium, fluids changed. Had run of PVC's, asymptomatic Concerns for physician to address:   
  
Zone phone for oncoming shift:   [phone] Activity: 
Activity Level: Bed Rest 
Number times ambulated in hallways past shift: 0 Number of times OOB to chair past shift: 0 Cardiac:  
Cardiac Monitoring: Yes     
Cardiac Rhythm: Normal sinus rhythm, Atrial fibrillation Access:  
Current line(s): PIV Genitourinary:  
Urinary status: incontinent Respiratory:  
O2 Device: Nasal cannula Chronic home O2 use?: NO Incentive spirometer at bedside: NO 
  
GI: 
Last Bowel Movement Date: (unable to assess) Current diet:  DIET NPO Except Meds Passing flatus: YES Tolerating current diet: YES Pain Management:  
Patient states pain is manageable on current regimen: N/A Skin: 
Jerson Score: 11 Interventions: speciality bed, float heels, increase time out of bed and foam dressing Patient Safety: 
Fall Score: Total Score: 3 Interventions: bed/chair alarm High Fall Risk: Yes Length of Stay: 
Expected LOS: 4d 19h Actual LOS: 5 Mariano Rich

## 2021-01-25 PROBLEM — J96.90 RESPIRATORY FAILURE (HCC): Status: ACTIVE | Noted: 2021-01-01

## 2021-01-25 NOTE — PROGRESS NOTES
Speech path If patient is minimally responsive, we will hold therapy attempts today Abdoulaye Karimi, SLP

## 2021-01-25 NOTE — PROGRESS NOTES
End of Shift Note Bedside shift change report given to Saida Bray (oncoming nurse) by Steven Solis (offgoing nurse). Report included the following information SBAR, Kardex, Intake/Output, MAR, Accordion and Recent Results Shift worked:  7-7p Shift summary and any significant changes:  
  pt transitioned to hospice Concerns for physician to address:  None Zone phone for oncoming shift:   [phone]

## 2021-01-25 NOTE — PROGRESS NOTES
Transition of Care Plan 
  
*Disposition: Hospice - GIP Chart reviewed. Palliative and [facility] are following. Pt's sister plans to visit with pt today. Per [facility], pending sister's decision, plan for transition to inpatient hospice. CM available as needed. Penny Stone [facility] Care Manager [facility] 
[phone]

## 2021-01-25 NOTE — HOSPICE
[employer] [facility] 
[phone] Nursing Note Patient Name: Attila Eric YOB: 1929 Age: 80 y.o. [employer] RN Note:  Hospice consult noted. Chart reviewed. Spoke with [address] [place] [address]/sister/decision-maker via phone. Hospice information session has been scheduled for tomorrow, 1/25, at 11am via phone conference. [address] [place] [address] with like to be called at [phone]. Thank you for the opportunity to be of service to this patient and her family.

## 2021-01-25 NOTE — PROGRESS NOTES
End of Shift Note Bedside shift change report given to [facility] (oncoming nurse) by Lance Schwab (offgoing nurse). Report included the following information SBAR, Kardex, Intake/Output, MAR and Recent Results Shift worked:  7p-7a Shift summary and any significant changes:  
  Pt had moments of bradycardia, but quickly came back up. Concerns for physician to address:   
  
Zone phone for oncoming shift:   [phone] Activity: 
Activity Level: Bed Rest 
Number times ambulated in hallways past shift: 0 Number of times OOB to chair past shift: 0 Cardiac:  
Cardiac Monitoring: Yes    
Cardiac Rhythm: Atrial fibrillation Access:  
Current line(s): PIV Genitourinary:  
Urinary status: incontinent Respiratory:  
O2 Device: Nasal cannula Chronic home O2 use?: NO Incentive spirometer at bedside: NO 
  
GI: 
Last Bowel Movement Date: (unable to assess) Current diet:  DIET NPO Except Meds DIET ONE TIME MESSAGE Passing flatus: YES Tolerating current diet: NO 
  
 
Pain Management:  
Patient states pain is manageable on current regimen: N/A Skin: 
Jerson Score: 11 Interventions: speciality bed and limit briefs Patient Safety: 
Fall Score: Total Score: 3 Interventions: bed/chair alarm High Fall Risk: Yes Length of Stay: 
Expected LOS: 4d 19h Actual LOS: 6 Lance Schwab

## 2021-01-25 NOTE — [facility] IDG SOCIAL WORKER NOTES
This LCSW met with pts sister and Samantha Ding for initial assessment. Pt has a hospice diagnosis of respiratory failure. Pt has multiple comorbidites including COVID 19, PNA r/t COVID 1, acute metabolic encephalopathy, and mild dementia. Pt was admitted 1/19/2021 from [facility] d/t hypoxia. LCSW met with pts sister outside pts room due to contact restrictions due to [facility]. LCSW will provide emotional support and supportive counseling to sister Marisol Castrejon in coping with pts imminent death due to respiratory failure in the setting of COVID 19. LCSW will provide grief support for sister Marisol Castrejon in processing grief and relational loss Moderate risk for BR due to COVID 19, and not being able to see pt due to COIVD 19 restrictions at [facility].

## 2021-01-25 NOTE — DISCHARGE SUMMARY
Hospitalist Discharge Summary Patient ID: 
Juliana Guzman 
[id number] 
89 y.o. 
2/18/1929 1/19/2021 PCP on record: Radha Olmedo MD 
 
Admit date: 1/19/2021 Discharge date and time: 1/25/2021 DISCHARGE DIAGNOSIS: 
Acute hypoxic respiratory failure POA Sepsis POA due to bilateral COVID-19 pneumonia CoNS bacterimia , most likely contaminat Acute metabolic encephalopathy POA Mild dementia Dysphagia Dehydration POA, improving Hypernatremia POA,resolved Hyper kalemia   
Oropharyngeal dysphagia POA 
ophaceous gout POA Systolic murmur Intrathoracic aortic aneurysm History of postmenopausal bleeding CONSULTATIONS: 
IP CONSULT TO PALLIATIVE CARE - PROVIDER Excerpted HPI from H&P of Paco Jacobs MD: 
Juliana Guzman is a 80 y.o. female past medical history of mild dementia hypertension hyperlipidemia, valvular heart disease, tophaceous gout, thoracic aortic aneurysm who typically resides in an assisted living facility until she came down with Covid and was transferred to [address] [place] [address] for further monitoring.  
  
She is sent today from the nursing home due to hypoxia with O2 sat reportedly of 75% on room air, 80% on 4 L and 95% on nonrebreather EMS. In the ER it has been difficult to get a good Plath reading for her O2 sat. ABG on room air showed O2 sat of 92% with PO2 of 61. Chest x-ray shows patchy airspace disease in the left upper and left lower lobe. We were asked to admit for work up and evaluation of the above problems.  
  
 
______________________________________________________________________ DISCHARGE SUMMARY/HOSPITAL COURSE:  for full details see H&P, daily progress notes, labs, consult notes. This is a 27-year-old female with past medical history of mild dementia, hypertension who was admitted today hospital for acute respiratory failure secondary to Covid pneumonia. Her course was complicated by acute metabolic encephalopathy, worsening dysphagia . Patient gradually got worse, became unresponsive. Family decided on hospice, patient was admitted to inpatient hospice. 
 
 
 
_______________________________________________________________________ Patient seen and examined by me on discharge day. Pertinent Findings: 
Gen:    In severe acute distress, unresponsive 
chest: Decreased bilateral breath sound CVS:   Regular rhythm. No edema Abd:  Soft, not distended, not tender Neuro: Unresponsive 
_______________________________________________________________________ DISCHARGE MEDICATIONS:  
Current Discharge Medication List  
  
 
 
 
 
Follow-up Information Follow up With Specialties Details Why Contact Info Hansa Anaya MD Internal Medicine   [address] [place] [id number] [phone] 
  
  
 
________________________________________________________________ Risk of deterioration: High 
 
Condition at Discharge:  Stable 
__________________________________________________________________ Disposition IP Hospice 
 
____________________________________________________________________ Code Status: DNR/DNI 
___________________________________________________________________ Total time in minutes spent coordinating this discharge (includes going over instructions, follow-up, prescriptions, and preparing report for sign off to her PCP) :  >30 minutes Signed: 
Kevin Reece MD

## 2021-01-25 NOTE — HOSPICE
[employer] Good Help to Those in Need 
[phone] Inpatient Nursing Admission Patient Name: Ivonne Chaney YOB: 1929 Age: 80 y.o. Date of Hospice Admission: 1/25/2021 Hospice Attending Elected by Patient: Niranjan Ugalde MD 
Primary Care Physician: Tami Gaffney MD 
Admitting RN: Lj Caballero : Courtney Callaway Level of Care (GIP/Routine/Respite): GIP Facility of Care: [id number] [place] [address] Patient Room: [facility]/ HOSPICE SUMMARY  
ER Visits/ Hospitalizations in past year:  
Hospice Diagnosis: Respiratory failure ([facility] [J96.90] Onset Date of Hospice Diagnosis: resp fialure Summary of Disease Progression Leading to Hospice Diagnosis:  
 
Co-Morbidities:  
Patient Active Problem List  
Diagnosis Code  Acute respiratory failure with hypoxia (HCC) J96.01  
 Pneumonia due to COVID-19 virus U07.1, J12.82  Severe sepsis (HCC) A41.9, R65.20  SNEHAL (acute kidney injury) ([facility] [address] [facility] N17.9  Metabolic encephalopathy H44.40  
 Oropharyngeal dysphagia R13.12  
 Counseling regarding advance care planning and goals of care Z71.89  Respiratory failure ([facility] [address] J96.90 Principle Hospice Diagnosis: Acute hypoxic respiratory failure Diagnoses RELATED to the terminal prognosis: COVID-19 pneumonia, sepsis, metabolic encephalopathy, mild cognitive decline, dysphagia Other Diagnoses:  
  
 HOSPICE SUMMARY  
  
Ivonne Chaney is a 80y.o. year old who was admitted to [facility].  
  
Patient is a 80-year-old female is presented to the hospital on 1/19 with left upper lobe pneumonia and unfortunately COVID-19 infection as the cause of the pneumonia. Patient admitted to the hospital and treated with broad-spectrum antibiotics, steroids, vitamin C, zinc, remdesivir. Patient struggling with ongoing dysphagia, shortness of breath, as well as ongoing encephalopathy.   Given her overall decline, family is elected to transition to comfort with the support of hospice. 
  
 The patient's principle diagnosis has resulted in respiratory failure Refer to LCD  
  
Functionally, the patient's Karnofsky and/or Palliative Performance Scale has declined over a period of weeks and is estimated at 10 the patient is dependent on the following ADLs: All 
  
Objective information that support this patients limited prognosis includes:  
Chest x-ray with left upper lobe and left lower lobe infiltrate 
  The patient/family chose comfort measures with the support of Hospice. 
  
 
Patient meets for GIP LOC as evidenced by resp distress and agitation Prognosis estimated based on 01/25/21 clinical assessment is:  
[] Few to Many Hours [x] Hours to Days  
[] Few to Many Days  
[] Days to Weeks  
[] Few to Many Weeks  
[] Weeks to Months  
[] Few to Many Months ASSESSMENT Patient self-reports:  []  Yes    [x] No 
 
SYMPTOMS:  resp distress, agitation SIGNS/PHYSICAL FINDINGS: pt is unresponsive KARNOFSKY: 10% FAST for all dementia:   
 
Learning Assessment: 
Patient  N/A Is patient willing/able to learn? What is the highest level of education completed? Learning preference (written material, demonstration, visual)? Learning barriers (ESOL, [place], poor vision)? Caregiver  Sister Moses Willis Is caregiver willing to learn care for patient? yes What is the highest level of education completed? university Learning preference (written material, demonstration, visual)? demonstration Learning barriers (ESOL, [place], poor vision)? None expressed CLINICAL INFORMATION Wt Readings from Last 3 Encounters:  
01/25/21 55.9 kg (123 lb 3.8 oz) 10/15/20 49 kg (108 lb) Ht Readings from Last 3 Encounters:  
01/19/21 4' 11.84\" (1.52 m)  
10/15/20 5' (1.524 m) There is no height or weight on file to calculate BMI. There were no vitals taken for this visit. LAB VALUES No results found for this visit on 01/25/21 (from the past 12 hour(s)). No results found for this visit on 01/25/21 (from the past 6 hour(s)). Lab Results Component Value Date/Time Protein, total 6.2 (L) 01/23/2021 02:31 AM  
 Albumin 2.5 (L) 01/23/2021 02:31 AM  
 
 
Currently this patient has: 
[x] Supplemental O2 [x] Peripheral IV  [] PICC    [] PORT  
[] Elizabeth Catheter [] NG Tube   [] PEG Tube [] Ostomy   
[] AICD: Has ICD been deactivated? [] Yes [] No:______ PLAN 1. Patient will be admitted to [facility] as patient needs frequent nursing assessment, IV medication management, not safe to transition to sublingual medication given her encephalopathy. Patient also not safe to transition home. 2. Patient will be started on Dilaudid 0.5 mg IV every 4 hours scheduled and every 15 minutes as needed 3. Patient be started on Ativan 0.5 mg IV every 4 hours scheduled every 15 minutes as needed 4. Comfort order set placed for all other symptoms. 5. Plan reviewed with bedside nurse, hospice liaison. I also was able to speak with patient's sister who was visiting and watching the patient from the window. We reviewed the concept of hospice care and she certainly supports focusing on comfort. 
  
6.  and SW to support family needs 7. Disposition: Likely will die in the hospital 
8. Hospice Plan of care was reviewed in detail and agree with current plan of care Hospice Team Frequency Orders: 
Skilled Nurse -   Daily x 7 days /every other day x 7 days  with 5 PRN visits for symptom control. DEION  1 visit for initial assessment/evaluation for family support and need for volunteer services. Lebron Mayo  1 visit for initial assessment/evaluation for spiritual support. ADVANCE CARE PLANNING (Complete in ACP Flow Sheet) Code Status: DNR Durable DNR: [x]  Yes  []  No 
Code Status Discussed/Confirmed:yes Preference for Other Life Sustaining Treatment Discussed/Confirmed:  No life prolonging measures desired Hospitalization Preference: prefers to remain at [facility] through end of life Advance Care Planning 2021 Patient's Healthcare Decision Maker is: Legal Next of Kin Confirm Advance Directive Yes, not on file  Service: [] Yes  []  No      [x] Unknown Appropriate for Pinning Ceremony:  [] Yes     [x] No 
Church: UNKNOWN  Home: found and [place] [phone] DISCHARGE PLANNING 1. Discharge Plan: return to facility should pt stabilize 2. Patient/Family teaching: end of life process 3. Response to patient/family teaching: expressed understanding SOCIAL/EMOTIONAL/SPIRITUAL NEEDS Spiritual Issues Identified: to be evaluated by hospice chaplain Psych/ Social/ Emotional Issues Identified:  [id number] signed consents and provided support to pt's sister Nayana Rosado Caregiver Support: 
[x] Provided information on End of Life Care  
[] Material Provided: Gone From My Sight or [facility]  
 
CARE COORDINATION Dr. Brandee Diaz contacted, discharge to hospice order received Dr. Carolyn Cotter contacted, agrees to serve as attending provider for hospice and provided verbal certification of terminal illness with life expectancy of 6 months or less. Orders for hospice admission, medications and plan of treatment received. Medication reconciliation completed. MEDS: See medication list below DME: Per hospital 
Supplies: Per hospital 
IDT communication to include MD, SN, SW, CH and support team 
 
ALLERGIES AND MEDICATIONS Allergies: Allergies Allergen Reactions  Nsaids (Non-Steroidal Anti-Inflammatory Drug) Unknown (comments)  Penicillins Unknown (comments)  Sulfa (Sulfonamide Antibiotics) Unknown (comments)  Sulfasalazine Unknown (comments) Current Facility-Administered Medications Medication Dose Route Frequency  ketorolac (TORADOL) injection 15 mg  15 mg IntraVENous Q8H PRN  
  glycopyrrolate (ROBINUL) injection 0.2 mg  0.2 mg IntraVENous Q4H PRN  
 bisacodyL (DULCOLAX) suppository 10 mg  10 mg Rectal DAILY PRN  
 HYDROmorphone (PF) (DILAUDID) injection 0.5 mg  0.5 mg IntraVENous Q15MIN PRN  
 HYDROmorphone (DILAUDID) injection 0.5 mg  0.5 mg IntraVENous Q4H  
 HYDROmorphone (DILAUDID) injection 0.5 mg  0.5 mg IntraVENous Q15MIN PRN  
 LORazepam (ATIVAN) injection 0.5 mg  0.5 mg IntraVENous Q15MIN PRN  
 LORazepam (ATIVAN) injection 0.5 mg  0.5 mg IntraVENous Q4H  
 sodium chloride (NS) flush 5 mL  5 mL IntraVENous PRN

## 2021-01-25 NOTE — HOSPICE
[facility] RN note:  Pt appears to be uncomfortable with labored breathing and slight agitation. Discussed pt with staff RN Adonay who states that pt is minimally responsive and unable to take po medications which is new today. TC to sister Jeffrey Lacy and niece Tuyet Hansen to update on clinical changes and express concern for pt comfort. Sister Haris Benton is coming to the hospital today if she can get a ride form her son Abiola Small. Will continue conversation about hospice once they get a chance to see pt but want her to be comfortable now. Discussed adding comfort medications (dilaudid and ativan)  with palliative NP Emmanuel for now with plans for inpt admission once family arrives and is in agreement. 12:00---pt's sister Haris Benton arrived to [address] with her son Abiola Small. Haris Benton saw pt through the window but did not go in due to concern for COVID. Discussed hospice involvement for onging comfort through what Nury now realizes is for end of life. Consent forms signed by Nury with  Oscar Sorto. Approval for inpt admission at [facility] level of care. Thank you for the opportunity to care for this pt and family. Please contact hospice at [phone] with any questions or concerns.

## 2021-01-25 NOTE — HOSPICE
[facility] Good Help to Those in Need 
[phone] Social Work Admission Note Patient Name: Juliana Guzman YOB: 1929 Age: 80 y.o. Date of Visit: 01/25/21 Facility of Care: [facility] Patient Room: 1313/01 Hospice Attending: Cruz Sanon MD 
Hospice Diagnosis: Respiratory Failure COVID + Level of Care:  
 [x]  GIP []  Respite []  Routine NARRATIVE This LCSW met with pts sister and Qiana Narayan for initial assessment. Pt has a hospice diagnosis of respiratory failure. Pt has multiple comorbidites including COVID 19, PNA r/t COVID 1, acute metabolic encephalopathy, and mild dementia. Pt was admitted [facility]/19/[facility] from [facility] d/t hypoxia. Pt is  and has no children. Pt had been living in [facility], but was moved to [address] [place] [address] to her sister. LCSW met with pts sister outside pts room due to contact restrictions due to [facility]. LCSW provided emotional support and supportive counseling to sister Johanny Dow in coping with pts imminent death due to respiratory failure in the setting of COVID 19. LCSW provided grief support for sister Ella Osorio in processing grief and relational loss Ella Osorio reports pts was \" like a mother to me\". LCSW provided reassurance of my support and of pts comfort. Moderate risk for BR due to COVID 19, and not being able to see pt due to COIVD 19 restrictions at [facility] LCSW will continue to assess and monitor pt and family needs. ADVANCE CARE PLANNING Code Status: DNR Durable DNR: _ Yes  X_ No 
Advance Care Planning 1/22/2021 Patient's Healthcare Decision Maker is: Legal Next of Kin Confirm Advance Directive Yes, not on file Relationship Status: 
[]  Single    
[]       
[]     
[]  Domestic Partner    
[x]  / 
[]  Common Law 
[]   
[]  Unknown If in a relationship, name of partner/spouse: 
Duration of relationship: 
 
Yarsanism: UNKNOWN 
 
  Home: [address] ( [phone]) Resources Provided: Hospice information Social Work Initial Assessment Gender: 
female Race/Ethnicity: (tamika all that apply) []  American [place] [facility]) or [place] Native 
[]   
[]  Black or [place] American 
[]   or  
[]   or [place] 
[x]  Kt 
[]  Unknown 
  
 Service:   
[]  Yes  
[x]  No      
[]  Unknown Appropriate for Pinning Ceremony:  
[]  Yes     
[]  No 
Is patient using VA benefits? []  Yes     
[]  No 
  
Primary Language: English  
[]   Needed 
[]   utilized during visit Ability to express thoughts/needs/feelings 
[]  Expressed thoughts/feelings/needs without difficulty 
[]  Requires extra time and cuing 
[]  Speech limited single words 
[]  Uses only gestures (eye, blinking eye or head movement/pointing) []  Unable to express thoughts/feelings/needs (speech unintelligible or inappropriate) [x]  Minimally responsive Notes:  
  
Mental Status: 
[]  Alert-oriented to:   
 []  Person   
 []  Place   
 []  Time 
[]  Comatose-responds to:  
 []   Verbal stimuli  
 []  Tactile stimuli  
 []  Painful stimuli 
[]  Forgetful 
[]  Disoriented/Confused 
[]  Lethargic 
[]  Agitated 
[x]  Other (specify): Minimally responsive   
Notes:  
  
Patients description of Illness/Current Health Status:   
[x]  Patient unable to discuss,Minimally responsive  
[]  Patient unwilling to discuss 
[]  (Specify) Knowledge/Understanding of Disease Process Patient:  
 []  Demonstrates knowledge/understanding of disease process 
 []  Demonstrates knowledge/understanding of treatment plan 
 []  Demonstrates knowledge/understanding of prognosis []  Demonstrates acceptance of prognosis []  Demonstrates knowledge/understanding of resuscitation status [x]  Other (specify), Minimally responsive Caregiver: 
 [x]  Demonstrates knowledge/understanding of disease process [x]  Demonstrates knowledge/understanding of treatment plan 
 [x]  Demonstrates knowledge/understanding of prognosis [x]  Demonstrates acceptance of prognosis [x]  Demonstrates knowledge/understanding of resuscitation status 
 []  Other (specify) Notes:  
  
Patients living arrangement/care setting: 
Use the PRIOR COLUMN when the PATIENTS current health status necessitated a change in his/her primary residence. Prior Current Response  
           []             []    Patients own home/residence []             []    Home of family member/friend []             []    Boarding home  
           []             []    Assisted living facility/long-term center []             []    Hospital/Acute care facility []             []    Skilled nursing facility [x]             []    Long term care facility/Nursing home  
           []             [x]    Hospice in Patient Primary Caregiver: 
[]  No Primary Caregiver Name of Primary Caregiver: Rinku Greene Relationship or Primary Caregiver:  
 []  Spouse/Significant other     
 []  Natural Child      
 []  Step child     
 [x]  Sibling 
 []  Parent 
 []  Friend/Neighbor 
 []  Community/Christian Volunteer 
 []  Paid help 
 []  Other (specify):___________ Notes:   
  
Family members/Significant others: 
Name: Rinku Greene Relationship: sister Phone Number: [phone] Actively involved in care? [x]  Yes  []  No 
 
Name: 
Relationship: 
Phone Number: Actively involved in care? []  Yes  []  No 
 
Name: 
Relationship: 
Phone Number: Actively involved in care? []  Yes  []  No 
 
Social support systems: (select ONE best description) [x]  Excellent social support system which includes three or more family members or friends 
[]  Good social support system which includes two or less members or friends 
[]  [address] support which includes one family member or friend []  Poor social support; no family members or friends; basically ALONE Notes:  
  
Emotional Status: (tamika all that apply) Patient Caregiver Response [x]                [x]    Mood/Affect stable and appropriate    
              []                []    Angry  
              []                []    Anxious []                []    Apprehensive []                []    Avoidant  
              []                []    Clinging  
              []                []    Depressed  
              []                []    Distraught  
              []                []    Elated []                []    Euphoric  
              []                []    Fearful  
              []                []    Flat Affect  
              []                []    Helpless []                []    Hostile []                []    Impulsive []                []    Irritable  
              []                []    Labile  
              []                []    Manic  
              []                []    Restlessness []                [x]    Sad  
              []                []    Suspicious []                [x]    Tearful  
              []                []    Withdrawn Notes:  
 
Coping Skills (strengths/weakness):  
 Patient: Coping Skills (strength/weakness): Minimally responsive Family/caregiver (strength/weakness): Well supported by her son and , pt was moved to [place] from [facility], without sister being aware, pt is COVID +. 
  
[place] of care (tamika all that apply):    
[]  No burden evident  
[]  Family must administer medications  
[]  Illness causing financial strain  
[x]  Family/Support feels overwhelmed  
[]  Family/Support sleep disturbed with patients care  
[]  Patients care causes extra physical stress  of death 
[]  Illness causes changes in family lifestyle []  Illness impacting family/support employment 
[]  Family experiencing increased time demands 
[]  Patients behavior endangers family 
[]  Denial of patients illness 
[]  Concern over outcome of illness/fear 
[]  Patients behavior embarrassing to family Notes:  
  
Risk Factors: (tamika all that apply):   
[x]  No burden evident  
[]  Alcohol abuse 
[]  Financial resources inadequate to meet basic needs (food/house/etc) []  Financial resources inadequate to meet health care needs (supplies/equipment/medications) 
[]  Food/nutrition resources inadequate 
[]  Home environment unsafe/inadequate for home care 
[]  Homicidal risk 
[]  Lives alone or without concerned relatives 
[]  Multiple medications/complex schedule 
[]  Physical limitations increase likelihood of falls 
[]  Plan of care/treatments complicated 
[]  Substance use/abuse 
[]  Suicidal risk 
[]  Visual impairment threatens safety/ability to perform self-care 
[]  Other (specify): 
  
Abuse/Neglect (actual/potential risks): 
[x]  No signs of abuse/neglect 
[]  History of abuse/neglect                 []  [id number]          []  Sexual 
[]  History of domestic violence 
[]  Lacks adequate physical care 
[]  Lacks emotional nurturing/support 
[]  Lacks appropriate stimulation/cognitive experiences 
[]  Left alone inappropriately 
[]  Lacks necessary supervision 
[]  Inadequate or delayed medical care 
[]  Unsafe environment (i.e guns/drug use/history of violence in the home/etc.) []  Bruising or other physical signs of injury present 
[]  Other (specify): 
Notes:  
[]  Refer to child/adult protective services Current Sources of Stress (in Addition to Current Illness):  
[x]  None reported 
[]  Bills/Debt   
[]  Career/Job change   
[]   (short term)   
[]   (long term)   
[]  Death of a child (recent)   
[]  Death of a parent (recent)  
[]  Death of a spouse (recent)  
[]  Employment status changed  
[]  Family discord   
 []  Financial loss/Inadequate inther (specify):come 
[]  Job loss 
[]  Legal issues unresolved 
[]  Lifestyle change 
[]  Marital discord 
[]  Marriage within the last year 
[]  Paperwork (insurance/legal/etc) overwhelming 
[]  Separation/Divorce 
[]  Other (specify): 
Notes:  
  
Current [employer] Being Utilized 1. Interventions/Plan of Care 1. Assess social and emotional factors related to coping with end of life issues 2. Community resource planning/referral  
3. Relocation to different care setting if/when symptoms stabilize 4. Discharge Planning 1. Placement if stabilizes. MSW Assessment Completed by: Ari Rico 01/25/21 Time In: 4:00  pm     
Time Out:5:00 pm

## 2021-01-25 NOTE — H&P
[address] [place] [address] [facility] 
[phone] Patient Name: Adan Hogan YOB: 1929 Date of Provider Hospice Visit: 01/25/21 Level of Care:   [x] General Inpatient (GIP)    [] Routine   [] Respite Current Location of Care: 
[] [facility] [] [facility] [x] [facility] [] [facility] [] Hospice [facility], patient referred from: 
[] [facility] [] [facility] [] [facility] [] [facility] [] Home [] Other:  
 
Date of Original Hospice Admission: 1/25/21 Hospice Medical Director at time of admission: There[facility] Diagnosis: Acute hypoxic respiratory failure Diagnoses RELATED to the terminal prognosis: COVID-19 pneumonia, sepsis, metabolic encephalopathy, mild cognitive decline, dysphagia Other Diagnoses: Aisha Hogan is a 80y.o. year old who was admitted to [address] [place] [address]. Patient is a 59-year-old female is presented to the hospital on 1/19 with left upper lobe pneumonia and unfortunately COVID-19 infection as the cause of the pneumonia. Patient admitted to the hospital and treated with broad-spectrum antibiotics, steroids, vitamin C, zinc, remdesivir. Patient struggling with ongoing dysphagia, shortness of breath, as well as ongoing encephalopathy. Given her overall decline, family is elected to transition to comfort with the support of hospice. The patient's principle diagnosis has resulted in respiratory failure Refer to LCD Functionally, the patient's Karnofsky and/or Palliative Performance Scale has declined over a period of weeks and is estimated at 10 the patient is dependent on the following ADLs: All Objective information that support this patients limited prognosis includes:  
Chest x-ray with left upper lobe and left lower lobe infiltrate The patient/family chose comfort measures with the support of Hospice. HOSPICE DIAGNOSES Active Symptoms: 1. Shortness of breath 2. Restlessness with agitation 3.  Acute hypoxic respiratory failure 4. Hospice care patient 5. COVID-19 pneumonia PLAN 1. Patient will be admitted to [facility] level care as patient needs frequent nursing assessment, IV medication management, not safe to transition to sublingual medication given her encephalopathy. Patient also not safe to transition home. 2. Patient will be started on Dilaudid 0.5 mg IV every 4 hours scheduled and every 15 minutes as needed 3. Patient be started on Ativan 0.5 mg IV every 4 hours scheduled every 15 minutes as needed 4. Comfort order set placed for all other symptoms. 5. Plan reviewed with bedside nurse, hospice liaison. I also was able to speak with patient's sister who was visiting and watching the patient from the window. We reviewed the concept of hospice care and she certainly supports focusing on comfort. 6.  and SW to support family needs 7. Disposition: Likely will die in the hospital 
8. Hospice Plan of care was reviewed in detail and agree with current plan of care Prognosis estimated based on 01/25/21 clinical assessment is:  
[x] Hours to Days   
[] Days to Weeks   
[] Other: 
 
Communicated plan of care with: Hospice Case Manager; Hospice IDT; Care Team 
 
 GOALS OF CARE Patient/Medical POA stated Goal of Care: Comfort care with support hospice [] I have reviewed and/or updated ACP information in the Advance Care Planning Navigator. This information is available in the [address] link in the patient's chart header. Primary Decision Maker ([address]):   Primary Decision Maker (Active): Fay Preston Sister - [phone] Resuscitation Status: DNR If DNR is there a Durable DNR on file? : [x] Yes [] No (If no, complete Durable DNR) HISTORY History obtained from: Chart, sister, bedside nurse CHIEF COMPLAINT: Shortness of breath The patient is:  
[] Verbal 
[] Nonverbal 
[x] Unresponsive HPI/SUBJECTIVE: Patient did not speak when I evaluated her. Did show some evidence of increased work of breathing REVIEW OF SYSTEMS The following systems were: [] reviewed  [x] unable to be reviewed Positive ROS include: 
Constitutional: fatigue, weakness, in pain, short of breath Ears/nose/mouth/throat: increased airway secretions Respiratory:shortness of breath, wheezing Gastrointestinal:poor appetite, nausea, vomiting, abdominal pain, constipation, diarrhea Musculoskeletal:pain, deformities, swelling legs Neurologic:confusion, hallucinations, weakness Psychiatric:anxiety, feeling depressed, poor sleep Endocrine:  
 
Adult Non-Verbal Pain Assessment Score: 4 Face 
[] 0   No particular expression or smile 
[x] 1   Occasional grimace, tearing, frowning, wrinkled forehead 
[] 2   Frequent grimace, tearing, frowning, wrinkled forehead Activity (movement) [] 0   Lying quietly, normal position 
[x] 1   Seeking attention through movement or slow, cautious movement 
[] 2   Restless, excessive activity and/or withdrawal reflexes Guarding 
[x] 0   Lying quietly, no positioning of hands over areas of body 
[] 1   Splinting areas of the body, tense 
[] 2   Rigid, stiff Physiology (vital signs) [x] 0   Stable vital signs [] 1   Change in any of the following: SBP > 20mm Hg; HR > 20/minute 
[] 2   Change in any of the following: SBP > 30mm Hg; HR > 25/minute Respiratory 
[] 0   Baseline RR/SpO2, compliant with ventilator 
[] 1   RR > 10 above baseline, or 5% drop SpO2, mild asynchrony with ventilator 
[x] 2   RR > 20 above baseline, or 10% drop SpO2, asynchrony with ventilator FUNCTIONAL ASSESSMENT Palliative Performance Scale (PPS): 10 PSYCHOSOCIAL/SPIRITUAL ASSESSMENT Active Problems: 
  Respiratory failure ([facility] [address] (1/25/2021) Past Medical History:  
Diagnosis Date  GERD (gastroesophageal reflux disease)  HTN (hypertension)  Hyperlipidemia  Postmenopausal vaginal bleeding  Tophaceous gout No past surgical history on file. Social History Tobacco Use  Smoking status: Never Smoker  Smokeless tobacco: Never Used Substance Use Topics  Alcohol use: Not on file Family History Family history unknown: Yes Allergies Allergen Reactions  Nsaids (Non-Steroidal Anti-Inflammatory Drug) Unknown (comments)  Penicillins Unknown (comments)  Sulfa (Sulfonamide Antibiotics) Unknown (comments)  Sulfasalazine Unknown (comments) Current Facility-Administered Medications Medication Dose Route Frequency  ketorolac (TORADOL) injection 15 mg  15 mg IntraVENous Q8H PRN  
 glycopyrrolate (ROBINUL) injection 0.2 mg  0.2 mg IntraVENous Q4H PRN  
 bisacodyL (DULCOLAX) suppository 10 mg  10 mg Rectal DAILY PRN  
 HYDROmorphone (PF) (DILAUDID) injection 0.5 mg  0.5 mg IntraVENous Q15MIN PRN  
 HYDROmorphone (PF) (DILAUDID) injection 0.5 mg  0.5 mg IntraVENous Q4H  
 HYDROmorphone (PF) (DILAUDID) injection 0.5 mg  0.5 mg IntraVENous Q15MIN PRN  
 LORazepam (ATIVAN) injection 0.5 mg  0.5 mg IntraVENous Q15MIN PRN  
 LORazepam (ATIVAN) injection 0.5 mg  0.5 mg IntraVENous Q4H  
 sodium chloride (NS) flush 5 mL  5 mL IntraVENous PRN PHYSICAL EXAM  
 
Wt Readings from Last 3 Encounters:  
01/25/21 55.9 kg (123 lb 3.8 oz) 10/15/20 49 kg (108 lb) There were no vitals taken for this visit. Supplemental O2  [x] Yes  [] NO Last bowel movement:  
 
Currently this patient has: 
[x] Peripheral IV [] PICC  [] PORT [] ICD [x] Elizabeth Catheter [] NG Tube   [] PEG Tube   
[] Rectal Tube [] Drain 
[] Other:  
 
Constitutional: Ill-appearing, appears to show evidence of increased work of breathing Eyes: Minimally reactive ENMT: Dry 
Cardiovascular: Tachycardic Respiratory: Respiratory rate in the upper teens low 20s, few secretions, slight accessory muscle use in the chest wall Gastrointestinal: Soft Musculoskeletal: Muscle wasting Skin: Areas of ecchymosis Neurologic: Nonfocal 
Psychiatric: Slightly restless Other:  
 
 
Pertinent Lab and or Imaging Tests: 
Lab Results Component Value Date/Time Sodium 145 01/25/2021 12:39 AM  
 Potassium 4.6 01/25/2021 12:39 AM  
 Chloride 121 (H) 01/25/2021 12:39 AM  
 CO2 19 (L) 01/25/2021 12:39 AM  
 Anion gap 5 01/25/2021 12:39 AM  
 Glucose 131 (H) 01/25/2021 12:39 AM  
 BUN 26 (H) 01/25/2021 12:39 AM  
 Creatinine 1.22 (H) 01/25/2021 12:39 AM  
 BUN/Creatinine ratio 21 (H) 01/25/2021 12:39 AM  
 GFR est AA 50 (L) 01/25/2021 12:39 AM  
 GFR est non-AA 41 (L) 01/25/2021 12:39 AM  
 Calcium 9.1 01/25/2021 12:39 AM  
 
Lab Results Component Value Date/Time  Protein, total 6.2 (L) 01/23/2021 02:31 AM  
 Albumin 2.5 (L) 01/23/2021 02:31 AM  
 
   
 
Total time:  
Counseling / coordination time:  
> 50% counseling / coordination?:

## 2021-01-26 NOTE — PROGRESS NOTES
End of Shift Note Bedside shift change report given to [facility] (oncoming nurse) by Amarjit Hill RN (offgoing nurse). Report included the following information [employer]ex Shift worked:  7p-7a Shift summary and any significant changes:  
  no significant changes Concerns for physician to address:  N/A Zone phone for oncoming shift:   [phone] Activity: 
  
Number times ambulated in hallways past shift: 0 Number of times OOB to chair past shift: 0 Cardiac:  
Cardiac Monitoring: No     
  
 
Access:  
Current line(s): PIV Genitourinary:  
Urinary status: saravia Respiratory:  
  
Chronic home O2 use?:  
Incentive spirometer at bedside:  
  
GI: 
  
Current diet:  No diet orders on file Passing flatus: Tolerating current diet: NO (NPO) Pain Management:  
Patient states pain is manageable on current regimen: N/A Skin: 
Jerson Score: 10 Interventions: float heels and internal/external urinary devices Patient Safety: 
Fall Score: Total Score: 2 Interventions:  
High Fall Risk: Yes Length of Stay: 
Expected LOS: - - - Actual LOS: 0 Amarjit Hill RN

## 2021-01-26 NOTE — HOSPICE
[facility] RN note:  Restlessness and resp distress improved after multiple medication adjustments before and after admission to hospice. Pt appears imminent with minimal UO, cool and discolored extremities, BP trending down. [facility] 
[phone] [facility] Daily Nursing Note Patient Name: Idania Huffman YOB: 1929 Age: 80 y.o. Date of Visit: 01/26/21 Facility of Care: [facility] Patient Room: 3257/01 Hospice Attending: Meghan Flood MD 
Hospice Diagnosis: Respiratory failure [facility]) [J96.90] Level of Care: [facility] Current [facility] Symptoms 1. resp distress 2. restlessness ASSESSMENT & PLAN Must update Plan of Care including visit frequencies for IDT members Continue [facility] level of care 1. resp distress:  Improved after multiple medication adjustments. Currently on scheduled dilaudid 0.5mg IV every 4 hrs with PRN availability 2. Restlessness:  Relieved after dose adjustment and newly scheduled lorazepam at 0.5mg IV every 4 hrs with PRN availability. 3. Pt appears imminent with minimal UO, cool and discolored extremities, BP trending down. 4. Emotional and spiritual support to pt and family. Spiritual Interventions: to be evaluated by hospice team 
 
Psych/ Social/ Emotional Interventions:  Trev Gravely following Care Coordination Needs:  
 
Care plan and New Orders discussed / approved with Stiven Pineda MD. Description History and Chart Review If this is initial GIP note must document RN assessment/MD communication in previous setting. Specifically document nursing/medication needs in last 24 hours to support GIP care Narrative History of last 24 hours that demonstrates care cannot be provided in another setting: 
Pt needing IV medicaitons due to severity of symptoms Pt is unresponsive requiring skilled nursing assessment for appropriate use of PRN medications for optimal symptom management. What has been done to control the patient's symptoms in the last 24 hours? Newly scheduled dilaudid and lorazepam effective at managing symptoms through final hours Does the patient currently require IV medications? yes Does the patient currently require scheduled medications? yes Does the patient currently require a PCA? no 
 
List number of doses of PRN medications in last 24 hours: 
Medication 1: 
Number of doses: 
 
Medication 2:  
Number of doses: 
 
Medication 3:  
Number of doses: Supporting documentation for GIP need for pain control: 
[] Frequent evaluation by a doctor, nurse practitioner, nurse  
[] Frequent medication adjustment   
[] IVs that cannot be administered at home  
[] Aggressive pain management  
[] Complicated technical delivery of medications Supporting documentation for GIP need for symptom control: 
[]  Sudden decline necessitating intensive nursing intervention 
[]  Uncontrolled / intractable nausea or vomiting  
[]  Pathological fractures 
[]  Advanced open wounds requiring frequent skilled care 
[] Unmanageable respiratory distress 
[] New or worsening delirium  
[] Delirium with behavior issues: Is 24 hour caregiver present due to safety concerns with agitation? (yes/no) [] Imminent death  with skilled nursing needs documented above DISCHARGE PLANNING Daily discharge planning required for GIP 1. Discharge Plan: facility should pt stabilize 2. Patient/Family teaching: end of life process 3. Response to patient/family teaching: expressed understanding ASSESSMENT   
KARNOFSKY: 10% Prognosis estimated based on 01/26/21 clinical assessment is:  
[] Few to Many Hours [] Hours to Days  
[] Few to Many Days  
[] Days to Weeks  
[] Few to Many Weeks  
[] Weeks to Months  
[] Few to Many Months Quality Measure: Patient self-reports:  [] Yes    [x] No 
 
ESAS:  
Time of Assessment: 10:00 Pain (1-10): Fatigue (1-10): Shortness of breath (1-10):3 Nausea (1-10): Appetite (1-10): Anxiety: (1-10): Depression: (1-10): Well-being: (1-10): Constipation: _ Yes  _ No 
LAST BM:  
 
CLINICAL INFORMATION Patient Vitals for the past 12 hrs: 
 Temp Pulse Resp BP SpO2  
01/26/21 0829 (!) 96.1 °F (35.6 °C) 60 18 (!) 76/53 98 % Currently this patient has: 
[x] Supplemental O2 [x] IV   
[] PICC [] PORT  
[] NG Tube   
[] PEG Tube  
[] Ostomy    
[x] Elizabeth draining _______ urine 
[] Other:  
 
SIGNS/PHYSICAL FINDINGS Skin (including wound): 
[] Warm, dry, supple, intact and color normal for race 
[] Warm  
[] Dry  
[] Cool    
[] Clammy      
[] Diaphoretic Turgor 
 [] Normal 
 [x] Decreased Color: 
 [] Pink 
 [] Pale [x] Cyanotic 
 [] Erythema 
 [] Jaundice [] Normal for Race 
[]  Wounds: 
 
Neuro: 
[] Lethargy 
[] Restlessness / agitation 
[] Confusion / delirium 
[] Hallucinations 
[] Responds to maximal stimulation 
[x] Unresponsive 
[] Seizures Cardiac: 
[] Dyspnea on Exertion 
[] JVD [] Murmur 
[] Palpitations [x] Hypotension 
[] Hypertension 
[] Tachycardia [x] Bradycardia 
[] Irregular HR 
[] Pulses Decreased 
[] Pulses Absent 
[] Edema:       (Location, Grade and Pitting) [] Mottling:      (Location) Respiratory: 
Breath sounds:  
 [x] Diminished 
 [] Wheeze 
 [] Rhonchi 
 [] Rales  
[] Even and unlabored 
[] Labored:           
[] Cough 
 [] Non Productive 
 [] Productive 
  [] Description:          
[] Deep suctioned  
[x] O2 at _2__ LPM 
[] High flow oxygen greater than 10 LPM 
[] Bi-Pap GI 
[] Abdomen (describe) [] Ascites 
[] Nausea 
[] Vomiting 
[x] Incontinent of bowels 
[] Bowel sounds (yes/no) [] Diarrhea 
[] Constipation (see above including last bowel movement) [] Checked for impaction 
[] Last BM Nutrition Diet:____NPO______ Appetite: [] Good  
[] Fair  
[] Poor  
[] Tube Feeding  
[] Voiding 
[] Incontinent [x] Elizabeth Musculoskeletal 
[] Balance/[place] Unsteady  
[] Weak Strength:  
 [] Normal  
 [] Limited [x] Decreasing Activities:  
 [] Up as tolerated 
 [x] Bedridden  
 [] Specify: 
 
SAFETY [x] 24 hr. Caregiver [x] Side rails ? [x] Hospital bed  
[x] Reviewed Falls & Safety ALLERGIES AND MEDICATIONS Allergies: Allergies Allergen Reactions  Nsaids (Non-Steroidal Anti-Inflammatory Drug) Unknown (comments)  Penicillins Unknown (comments)  Sulfa (Sulfonamide Antibiotics) Unknown (comments)  Sulfasalazine Unknown (comments) Current Facility-Administered Medications Medication Dose Route Frequency  ketorolac (TORADOL) injection 15 mg  15 mg IntraVENous Q8H PRN  
 glycopyrrolate (ROBINUL) injection 0.2 mg  0.2 mg IntraVENous Q4H PRN  
 bisacodyL (DULCOLAX) suppository 10 mg  10 mg Rectal DAILY PRN  
 HYDROmorphone (PF) (DILAUDID) injection 0.5 mg  0.5 mg IntraVENous Q15MIN PRN  
 HYDROmorphone (PF) (DILAUDID) injection 0.5 mg  0.5 mg IntraVENous Q4H  
 LORazepam (ATIVAN) injection 0.5 mg  0.5 mg IntraVENous Q15MIN PRN  
 LORazepam (ATIVAN) injection 0.5 mg  0.5 mg IntraVENous Q4H  
 sodium chloride (NS) flush 5 mL  5 mL IntraVENous PRN Visit Time In: 10:00 Visit Time Out: 11:00

## 2021-01-26 NOTE — PROGRESS NOTES
[employer] Good Help to Those in Need 
[phone] Patient Name: Dianna Medellin YOB: 1929 Date of Provider Hospice Visit: 01/26/21 Level of Care:   [x] General Inpatient (GIP)    [] Routine   [] Respite Current Location of Care: 
[] [facility] [] [facility] [x] [address] [] [facility] [] Hospice [facility], patient referred from: 
[] [facility] [] [facility] [] [address] [] [facility] [] Home [] Other:  
 
Date of Original Hospice Admission: 1/25/21 Hospice Medical Director at time of admission: Sofia Brian Principle Hospice Diagnosis: Acute hypoxic respiratory failure Diagnoses RELATED to the terminal prognosis: COVID-19 pneumonia, sepsis, metabolic encephalopathy, mild cognitive decline, dysphagia Other Diagnoses: Hailybaudilio Bustamante Mc is a 80y.o. year old who was admitted to [facility]. Patient is a 70-year-old female is presented to the hospital on 1/19 with left upper lobe pneumonia and unfortunately COVID-19 infection as the cause of the pneumonia. Patient admitted to the hospital and treated with broad-spectrum antibiotics, steroids, vitamin C, zinc, remdesivir. Patient struggling with ongoing dysphagia, shortness of breath, as well as ongoing encephalopathy. Given her overall decline, family is elected to transition to comfort with the support of hospice. The patient's principle diagnosis has resulted in respiratory failure Refer to LCD Functionally, the patient's Karnofsky and/or Palliative Performance Scale has declined over a period of weeks and is estimated at 10 the patient is dependent on the following ADLs: All Objective information that support this patients limited prognosis includes:  
Chest x-ray with left upper lobe and left lower lobe infiltrate The patient/family chose comfort measures with the support of Hospice. HOSPICE DIAGNOSES Active Symptoms: 1. Shortness of breath 2. Restlessness with agitation 3.  Acute hypoxic respiratory failure 4. Hospice care patient 5. COVID-19 pneumonia PLAN 1. Continue [facility] level care as patient needs frequent nursing assessment, IV medication management, not safe to transition to sublingual medication given her encephalopathy. Patient also not safe to transition home. 2. Patient's comfort has greatly improved with the addition of scheduled comfort meds. Death is appearing more imminent. Patient with low temperature and blood pressure in addition to cold and cyanotic extremities. 3. Continue Dilaudid 0.5 mg IV every 4 hours scheduled and every 15 minutes as needed 4. Continue Ativan 0.5 mg IV every 4 hours scheduled every 15 minutes as needed 5. Comfort order set placed for all other symptoms. 6.  and SW to support family needs 7. Disposition: anticipate will die in the hospital 
8. Hospice plan of care was reviewed with hospice liaison, floor staffing Prognosis estimated based on 01/26/21 clinical assessment is:  
[x] Hours to Days   
[] Days to Weeks   
[] Other: 
 
Communicated plan of care with: Hospice Case Manager; Hospice IDT; Care Team 
 
 GOALS OF CARE Patient/Medical POA stated Goal of Care: Comfort care with support hospice [] I have reviewed and/or updated ACP information in the Advance Care Planning Navigator. This information is available in the [address] link in the patient's chart header. Primary Decision Maker ([address]):   Primary Decision Maker (Active): Óscar Loyd - Sister - [phone] Resuscitation Status: DNR If DNR is there a Durable DNR on file? : [x] Yes [] No (If no, complete Durable DNR) HISTORY History obtained from: Chart, sister, bedside nurse CHIEF COMPLAINT: Shortness of breath The patient is:  
[] Verbal 
[] Nonverbal 
[x] Unresponsive HPI/SUBJECTIVE:  
1/25: Patient did not speak when I evaluated her. Did show some evidence of increased work of breathing 1/26: patient unresponsive. Cold and cyanotic extremities. No prns overnight. REVIEW OF SYSTEMS The following systems were: [] reviewed  [x] unable to be reviewed Positive ROS include: 
Constitutional: fatigue, weakness, in pain, short of breath Ears/nose/mouth/throat: increased airway secretions Respiratory:shortness of breath, wheezing Gastrointestinal:poor appetite, nausea, vomiting, abdominal pain, constipation, diarrhea Musculoskeletal:pain, deformities, swelling legs Neurologic:confusion, hallucinations, weakness Psychiatric:anxiety, feeling depressed, poor sleep Endocrine:  
 
Adult Non-Verbal Pain Assessment Score: 0 Face [x] 0   No particular expression or smile 
[] 1   Occasional grimace, tearing, frowning, wrinkled forehead 
[] 2   Frequent grimace, tearing, frowning, wrinkled forehead Activity (movement) [x] 0   Lying quietly, normal position 
[] 1   Seeking attention through movement or slow, cautious movement 
[] 2   Restless, excessive activity and/or withdrawal reflexes Guarding 
[x] 0   Lying quietly, no positioning of hands over areas of body 
[] 1   Splinting areas of the body, tense 
[] 2   Rigid, stiff Physiology (vital signs) [x] 0   Stable vital signs [] 1   Change in any of the following: SBP > 20mm Hg; HR > 20/minute 
[] 2   Change in any of the following: SBP > 30mm Hg; HR > 25/minute Respiratory [x] 0   Baseline RR/SpO2, compliant with ventilator 
[] 1   RR > 10 above baseline, or 5% drop SpO2, mild asynchrony with ventilator 
[] 2   RR > 20 above baseline, or 10% drop SpO2, asynchrony with ventilator FUNCTIONAL ASSESSMENT Palliative Performance Scale (PPS): 10 PSYCHOSOCIAL/SPIRITUAL ASSESSMENT Active Problems: 
  Respiratory failure ([facility] [address] (1/25/2021) Past Medical History:  
Diagnosis Date  GERD (gastroesophageal reflux disease)  HTN (hypertension)  Hyperlipidemia  Postmenopausal vaginal bleeding  Tophaceous gout No past surgical history on file. Social History Tobacco Use  Smoking status: Never Smoker  Smokeless tobacco: Never Used Substance Use Topics  Alcohol use: Not on file Family History Family history unknown: Yes Allergies Allergen Reactions  Nsaids (Non-Steroidal Anti-Inflammatory Drug) Unknown (comments)  Penicillins Unknown (comments)  Sulfa (Sulfonamide Antibiotics) Unknown (comments)  Sulfasalazine Unknown (comments) Current Facility-Administered Medications Medication Dose Route Frequency  ketorolac (TORADOL) injection 15 mg  15 mg IntraVENous Q8H PRN  
 glycopyrrolate (ROBINUL) injection 0.2 mg  0.2 mg IntraVENous Q4H PRN  
 bisacodyL (DULCOLAX) suppository 10 mg  10 mg Rectal DAILY PRN  
 HYDROmorphone (PF) (DILAUDID) injection 0.5 mg  0.5 mg IntraVENous Q15MIN PRN  
 HYDROmorphone (PF) (DILAUDID) injection 0.5 mg  0.5 mg IntraVENous Q4H  
 LORazepam (ATIVAN) injection 0.5 mg  0.5 mg IntraVENous Q15MIN PRN  
 LORazepam (ATIVAN) injection 0.5 mg  0.5 mg IntraVENous Q4H  
 sodium chloride (NS) flush 5 mL  5 mL IntraVENous PRN PHYSICAL EXAM  
 
Wt Readings from Last 3 Encounters:  
01/25/21 55.9 kg (123 lb 3.8 oz) 10/15/20 49 kg (108 lb) Visit Vitals BP (!) 76/53 (BP 1 Location: Left arm, BP Patient Position: At rest) Pulse 60 Temp (!) 96.1 °F (35.6 °C) Resp 18 SpO2 98% Supplemental O2  [x] Yes  [] NO Last bowel movement:  
 
Currently this patient has: 
[x] Peripheral IV [] PICC  [] PORT [] ICD [x] Elizabeth Catheter [] NG Tube   [] PEG Tube   
[] Rectal Tube [] Drain 
[] Other:  
 
Constitutional: lying abed not awake, frail appearing Eyes: lids normal, no drainage ENMT: Dry mm, nares normal 
Cardiovascular: normal rate, difficult to palpate pulse Respiratory: rate teens, breaths are shallow and pattern is irregular with pauses Gastrointestinal: Soft, NT 
Musculoskeletal: Muscle wasting Skin: Areas of ecchymosis, fingers and toes appear cyanotic Neurologic: does not wake or stir to exam 
Psychiatric: unresponsive, not restless or agitated at this time Pertinent Lab and or Imaging Tests: 
Lab Results Component Value Date/Time Sodium 145 01/25/2021 12:39 AM  
 Potassium 4.6 01/25/2021 12:39 AM  
 Chloride 121 (H) 01/25/2021 12:39 AM  
 CO2 19 (L) 01/25/2021 12:39 AM  
 Anion gap 5 01/25/2021 12:39 AM  
 Glucose 131 (H) 01/25/2021 12:39 AM  
 BUN 26 (H) 01/25/2021 12:39 AM  
 Creatinine 1.22 (H) 01/25/2021 12:39 AM  
 BUN/Creatinine ratio 21 (H) 01/25/2021 12:39 AM  
 GFR est AA 50 (L) 01/25/2021 12:39 AM  
 GFR est non-AA 41 (L) 01/25/2021 12:39 AM  
 Calcium 9.1 01/25/2021 12:39 AM  
 
Lab Results Component Value Date/Time  Protein, total 6.2 (L) 01/23/2021 02:31 AM  
 Albumin 2.5 (L) 01/23/2021 02:31 AM  
 
   
 
Luke Eckert MD

## 2021-01-26 NOTE — PROGRESS NOTES
Problem: Pressure Injury - Risk of 
Goal: *Prevention of pressure injury Description: Document Jerson Scale and appropriate interventions in the flowsheet. Outcome: Progressing Towards Goal 
Note: Pressure Injury Interventions: 
Sensory Interventions: Avoid rigorous massage over bony prominences, Check visual cues for pain, Float heels, Keep linens dry and wrinkle-free, Minimize linen layers, Monitor skin under medical devices, Pad between skin to skin, Pressure redistribution bed/mattress (bed type), Turn and reposition approx. every two hours (pillows and wedges if needed) Moisture Interventions: Apply protective barrier, creams and emollients, Check for incontinence Q2 hours and as needed, Internal/External urinary devices, Maintain skin hydration (lotion/cream), Minimize layers, Moisture barrier Activity Interventions: Pressure redistribution bed/mattress(bed type) Mobility Interventions: Float heels, HOB 30 degrees or less, Pressure redistribution bed/mattress (bed type), Turn and reposition approx. every two hours(pillow and wedges) Nutrition Interventions: (Pt is NPO) Friction and Shear Interventions: Feet elevated on foot rest, HOB 30 degrees or less, Lift sheet, Minimize layers

## 2021-01-26 NOTE — PROGRESS NOTES
End of Shift Note Bedside shift change report given to Abel Hurtado (oncoming nurse) by Susie Eckert (offgoing nurse). Report included the following information SBAR, Kardex, ED Summary, Intake/Output, MAR, Accordion and Recent Results Shift worked:  7-7p Shift summary and any significant changes:  
  pt resting comfortably Concerns for physician to address:  None Zone phone for oncoming shift:   [phone]

## 2021-01-27 NOTE — PROGRESS NOTES
Due to medical restriction of COVID, LCSW will not be going in pts room. This LCSW spoke with pts sister Preston Berman to offer support. Preston Berman was tearful as she describe recent tc to pt facilitated by Hospice RN Betzaida Aaron. LCSW and sister discussed visit. LCSW provided reflective listening for sister as she talked about pts move to [facility] from [facility] [facility], and move from [facility] to [place]. Sister expressed frustration with process of move as she was unaware of pts transfer to [place]. LCSW provided validation of feelings and affirmation of her concern for pt. LCSW provided grief support for pts sister Preston Berman in processing anticipatory grief and relational loss. LCSW provided supportive listening for sister as she talked about close relationship in the family between her and her sisters. Preston Berman reports their sister Betzaida Godoy, is in NH in [facility] with a dx of dementia, are \"so close they are like twins\". LCSW and sister discussed talking to Betzaida Godoy re pts nearing EOL. LCSW provided reassurance and support for sister in her decision not to tell Betzaida Godoy as to not to upset her due to dementia. LCSW will continue to assess and monitor pt and family needs.

## 2021-01-27 NOTE — PROGRESS NOTES
[employer] Good Help to Those in Need 
[phone] Patient Name: Karthik Rhodes YOB: 1929 Date of Provider Hospice Visit: 01/27/21 Level of Care:   [x] General Inpatient (GIP)    [] Routine   [] Respite Current Location of Care: 
[] [facility] [] [facility] [x] [facility] [] [facility] [] [facility] patient referred from: 
[] [facility] [] [facility] [] [facility] [] [facility] [] Home [] Other:  
 
Date of Original Hospice Admission: 1/25/21 Hospice Medical Director at time of admission: Ariel Turner Principle Hospice Diagnosis: Acute hypoxic respiratory failure Diagnoses RELATED to the terminal prognosis: COVID-19 pneumonia, sepsis, metabolic encephalopathy, mild cognitive decline, dysphagia Other Diagnoses: Alexandra Rhodes is a 80y.o. year old who was admitted to [facility]. Patient is a 80-year-old female is presented to the hospital on 1/19 with left upper lobe pneumonia and unfortunately COVID-19 infection as the cause of the pneumonia. Patient admitted to the hospital and treated with broad-spectrum antibiotics, steroids, vitamin C, zinc, remdesivir. Patient struggling with ongoing dysphagia, shortness of breath, as well as ongoing encephalopathy. Given her overall decline, family is elected to transition to comfort with the support of hospice. The patient's principle diagnosis has resulted in respiratory failure Refer to LCD Functionally, the patient's Karnofsky and/or Palliative Performance Scale has declined over a period of weeks and is estimated at 10 the patient is dependent on the following ADLs: All Objective information that support this patients limited prognosis includes:  
Chest x-ray with left upper lobe and left lower lobe infiltrate The patient/family chose comfort measures with the support of Hospice. HOSPICE DIAGNOSES Active Symptoms: 1. Shortness of breath 2. Restlessness with agitation 3.  Acute hypoxic respiratory failure 4. Hospice care patient 5. COVID-19 pneumonia 6. Increased tracheal secretions PLAN 1. Continue [facility] level care as patient needs frequent nursing assessment, IV medication management, not safe to transition to sublingual medication given her encephalopathy. Patient also not safe to transition home. Showing some evidence of increased work of breathing. Adjustments in medications made this morning after discussion with hospice liaison. 2. Increase Dilaudid to 1 mg IV every 4 hours scheduled and every 15 minutes as needed 3. Increase Ativan 1 mg mg IV every 4 hours scheduled every 15 minutes as needed 4. We will continue to monitor symptoms and make adjustments accordingly. We have also added Robinul 0.2 mg IV every 4 hours scheduled for secretions. 5. Comfort order set placed for all other symptoms. 6.  and SW to support family needs 7. Disposition: anticipate will die in the hospital 
8. Hospice plan of care was reviewed with hospice liaison, floor staffing Prognosis estimated based on 01/27/21 clinical assessment is:  
[x] Hours to Days   
[] Days to Weeks   
[] Other: 
 
Communicated plan of care with: Hospice Case Manager; Hospice IDT; Care Team 
 
 GOALS OF CARE Patient/Medical POA stated Goal of Care: Comfort care with support hospice [] I have reviewed and/or updated ACP information in the Advance Care Planning Navigator. This information is available in the [address] link in the patient's chart header. Primary Decision Maker ([address]):   Primary Decision Maker (Active): Ratnadestiny Elsie - Sister - [phone] Resuscitation Status: DNR If DNR is there a Durable DNR on file? : [x] Yes [] No (If no, complete Durable DNR) HISTORY History obtained from: Chart, sister, bedside nurse CHIEF COMPLAINT: Shortness of breath The patient is:  
[] Verbal 
[] Nonverbal 
[x] Unresponsive HPI/SUBJECTIVE:  
 1/25: Patient did not speak when I evaluated her. Did show some evidence of increased work of breathing 1/26: patient unresponsive. Cold and cyanotic extremities. No prns overnight. 
 
1/27patient is unresponsive but earlier showed evidence of increased work of breathing. Secretions still noted. REVIEW OF SYSTEMS The following systems were: [] reviewed  [x] unable to be reviewed Positive ROS include: 
Constitutional: fatigue, weakness, in pain, short of breath Ears/nose/mouth/throat: increased airway secretions Respiratory:shortness of breath, wheezing Gastrointestinal:poor appetite, nausea, vomiting, abdominal pain, constipation, diarrhea Musculoskeletal:pain, deformities, swelling legs Neurologic:confusion, hallucinations, weakness Psychiatric:anxiety, feeling depressed, poor sleep Endocrine:  
 
Adult Non-Verbal Pain Assessment Score: 3 Face 
[] 0   No particular expression or smile 
[x] 1   Occasional grimace, tearing, frowning, wrinkled forehead 
[] 2   Frequent grimace, tearing, frowning, wrinkled forehead Activity (movement) [] 0   Lying quietly, normal position 
[x] 1   Seeking attention through movement or slow, cautious movement 
[] 2   Restless, excessive activity and/or withdrawal reflexes Guarding 
[x] 0   Lying quietly, no positioning of hands over areas of body 
[] 1   Splinting areas of the body, tense 
[] 2   Rigid, stiff Physiology (vital signs) [x] 0   Stable vital signs [] 1   Change in any of the following: SBP > 20mm Hg; HR > 20/minute 
[] 2   Change in any of the following: SBP > 30mm Hg; HR > 25/minute Respiratory 
[] 0   Baseline RR/SpO2, compliant with ventilator 
[x] 1   RR > 10 above baseline, or 5% drop SpO2, mild asynchrony with ventilator 
[] 2   RR > 20 above baseline, or 10% drop SpO2, asynchrony with ventilator FUNCTIONAL ASSESSMENT Palliative Performance Scale (PPS): 10 PSYCHOSOCIAL/SPIRITUAL ASSESSMENT Active Problems: Respiratory failure ([facility] [address] (1/25/2021) Past Medical History:  
Diagnosis Date  GERD (gastroesophageal reflux disease)  HTN (hypertension)  Hyperlipidemia  Postmenopausal vaginal bleeding  Tophaceous gout No past surgical history on file. Social History Tobacco Use  Smoking status: Never Smoker  Smokeless tobacco: Never Used Substance Use Topics  Alcohol use: Not on file Family History Family history unknown: Yes Allergies Allergen Reactions  Nsaids (Non-Steroidal Anti-Inflammatory Drug) Unknown (comments)  Penicillins Unknown (comments)  Sulfa (Sulfonamide Antibiotics) Unknown (comments)  Sulfasalazine Unknown (comments) Current Facility-Administered Medications Medication Dose Route Frequency  HYDROmorphone (PF) (DILAUDID) injection 1 mg  1 mg IntraVENous Q4H  
 LORazepam (ATIVAN) injection 1 mg  1 mg IntraVENous Q4H  
 HYDROmorphone (PF) (DILAUDID) injection 1 mg  1 mg IntraVENous Q15MIN PRN  
 LORazepam (ATIVAN) injection 1 mg  1 mg IntraVENous Q15MIN PRN  
 glycopyrrolate (ROBINUL) injection 0.2 mg  0.2 mg IntraVENous Q4H  
 ketorolac (TORADOL) injection 15 mg  15 mg IntraVENous Q8H PRN  
 bisacodyL (DULCOLAX) suppository 10 mg  10 mg Rectal DAILY PRN  
 sodium chloride (NS) flush 5 mL  5 mL IntraVENous PRN PHYSICAL EXAM  
 
Wt Readings from Last 3 Encounters:  
01/25/21 55.9 kg (123 lb 3.8 oz) 10/15/20 49 kg (108 lb) Visit Vitals BP (!) 83/48 Pulse 67 Temp 97 °F (36.1 °C) Resp 15 SpO2 99% Supplemental O2  [x] Yes  [] NO Last bowel movement:  
 
Currently this patient has: 
[x] Peripheral IV [] PICC  [] PORT [] ICD [x] Elizabeth Catheter [] NG Tube   [] PEG Tube   
[] Rectal Tube [] Drain 
[] Other:  
 
Constitutional: lying abed not awake, frail appearing, respiratory rate has come down although was in the mid 20s earlier this morning. Eyes: lids normal, no drainage ENMT: Dry mm, nares normal 
Cardiovascular: normal rate, difficult to palpate pulse Respiratory: Respiratory rate in the mid teens now but up to the 20s earlier today, secretions noted Gastrointestinal: Soft, NT 
Musculoskeletal: Muscle wasting Skin: Areas of ecchymosis, fingers and toes appear cyanotic cool extremities, Neurologic: does not wake or stir to exam 
Psychiatric: unresponsive, not restless or agitated at this time Pertinent Lab and or Imaging Tests: 
Lab Results Component Value Date/Time Sodium 145 01/25/2021 12:39 AM  
 Potassium 4.6 01/25/2021 12:39 AM  
 Chloride 121 (H) 01/25/2021 12:39 AM  
 CO2 19 (L) 01/25/2021 12:39 AM  
 Anion gap 5 01/25/2021 12:39 AM  
 Glucose 131 (H) 01/25/2021 12:39 AM  
 BUN 26 (H) 01/25/2021 12:39 AM  
 Creatinine 1.22 (H) 01/25/2021 12:39 AM  
 BUN/Creatinine ratio 21 (H) 01/25/2021 12:39 AM  
 GFR est AA 50 (L) 01/25/2021 12:39 AM  
 GFR est non-AA 41 (L) 01/25/2021 12:39 AM  
 Calcium 9.1 01/25/2021 12:39 AM  
 
Lab Results Component Value Date/Time  Protein, total 6.2 (L) 01/23/2021 02:31 AM  
 Albumin 2.5 (L) 01/23/2021 02:31 AM  
 
   
 
Maria D Barnes MD

## 2021-01-27 NOTE — HOSPICE
[facility] Good Help to Those in Need 
[phone] GIP Daily Nursing Note Patient Name: Idania Huffman YOB: 1929 Age: 80 y.o. Date of Visit: 01/27/21 Facility of Care: [facility] Patient Room: 857/ Hospice Attending: Meghan Flood MD 
Hospice Diagnosis: Respiratory failure [facility]) [J96.90] Level of Care: GIP Current GIP Symptoms 1. Pain- moaning and groaning 2. Dyspnea- labored breathing, accessory muscle use 3. Weeping- bilateral arms 4. Wounds- fragile skin 5. hypotensive 6. Unresponsive, COVID + ASSESSMENT & PLAN Must update Plan of Care including visit frequencies for IDT members 1. Increased IV dilaudid to 1mg every 4 hours with prn available every 15 minutes for severe pain,respiratory distress 2. Increased IV ativan 1mg every 4 hours with prn available every 15 minutes for severe anxiety, agitation 3. Wrapped arm in chux pads, gave patient CHG bath, and repositioned. Reposition patient every 4 hours and continue to provide oral care and saravia care per protocol 4. Support family, daughter would like a phone call in room at 1pm today Spiritual Interventions: available 24 hours per day in hospital setting Psych/ Social/ Emotional Interventions: offer support to daughter today and throughout admission. Care Coordination Needs: Reviewed with Dr. Jorden Spatz Care plan and New Orders discussed / approved with Dr. Jorden Spatz MD. Description History and Chart Review If this is initial GIP note must document RN assessment/MD communication in previous setting. Specifically document nursing/medication needs in last 24 hours to support GIP care Narrative History of last 24 hours that demonstrates care cannot be provided in another setting: 
Patient requiring IV medications, titration of medications, RN monitoring What has been done to control the patient's symptoms in the last 24 hours? IV medications, prn medications, increased dosing Does the patient currently require IV medications? yes Does the patient currently require scheduled medications? yes Does the patient currently require a PCA? no 
 
List number of doses of PRN medications in last 24 hours: 
Medication 1: robinul Number of doses:1 Medication 2:  
Number of doses: 
 
Medication 3:  
Number of doses: Supporting documentation for GIP need for pain control: 
[x] Frequent evaluation by a doctor, nurse practitioner, nurse  
[] Frequent medication adjustment   
[x] IVs that cannot be administered at home  
[] Aggressive pain management  
[] Complicated technical delivery of medications Supporting documentation for GIP need for symptom control: 
[x]  Sudden decline necessitating intensive nursing intervention 
[]  Uncontrolled / intractable nausea or vomiting  
[]  Pathological fractures 
[]  Advanced open wounds requiring frequent skilled care [x] Unmanageable respiratory distress 
[] New or worsening delirium  
[] Delirium with behavior issues: Is 24 hour caregiver present due to safety concerns with agitation? (yes/no) [x] Imminent death  with skilled nursing needs documented above DISCHARGE PLANNING Daily discharge planning required for GIP 1. Discharge Plan: patient appears imminent, will likely pass at [facility] if patient were to stabilize transfer to home or NH 
2. Patient/Family teaching: end of life signs and symptoms 3. Response to patient/family teaching: accepting ASSESSMENT   
KARNOFSKY: 10 Prognosis estimated based on 01/27/21 clinical assessment is:  
[x] Few to Many Hours [] Hours to Days  
[] Few to Many Days  
[] Days to Weeks  
[] Few to Many Weeks  
[] Weeks to Months  
[] Few to Many Months Quality Measure: Patient self-reports:  [] Yes    [x] No 
 
ESAS:  
Time of Assessment: 6100 Pain (1-10):8 Fatigue (1-10): 8 Shortness of breath (1-10):8 Nausea (1-10): 5 Appetite (1-10):  
 Anxiety: (1-10): Depression: (1-10): Well-being: (1-10): Constipation: x_ Yes  _ No 
LAST BM: none charted since admit CLINICAL INFORMATION No data found. Currently this patient has: 
[x] Supplemental O2 [x] IV   
[] PICC [] PORT  
[] NG Tube   
[] PEG Tube  
[] Ostomy    
[x] Elizabeth draining yellow urine 
[] Other:  
 
SIGNS/PHYSICAL FINDINGS Skin (including wound): 
[] Warm, dry, supple, intact and color normal for race [x] Warm weeping skin [] Dry  
[] Cool    
[] Clammy      
[] Diaphoretic Turgor 
 [] Normal 
 [x] Decreased Color: 
 [] Pink [x] Pale 
 [] Cyanotic [x] Erythema 
 [] Jaundice [] Normal for Race 
[x]  Wounds:heel wound Neuro: 
[] Lethargy 
[] Restlessness / agitation 
[] Confusion / delirium 
[] Hallucinations 
[] Responds to maximal stimulation 
[x] Unresponsive 
[] Seizures Cardiac: 
[x] Dyspnea on Exertion 
[] JVD [] Murmur 
[] Palpitations [x] Hypotension 
[] Hypertension 
[] Tachycardia [] Bradycardia [x] Irregular HR [x] Pulses Decreased 
[] Pulses Absent [x] Edema: 3+ pitting, weeping bilateral arms       
[x] Mottling:     Bilateral hands and feet Respiratory: 
Breath sounds:  
 [x] Diminished 
 [] Wheeze [x] Rhonchi 
 [] Rales  
[] Even and unlabored 
[] Labored:           
[x] Cough [x] Non Productive 
 [] Productive 
  [] Description:          
[] Deep suctioned  
[x] O2 at 2___ LPM 
[] High flow oxygen greater than 10 LPM 
[] Bi-Pap GI [x] Abdomen (describe) soft 
[] Ascites 
[] Nausea 
[] Vomiting 
[x] Incontinent of bowels [x] Bowel sounds (yes/no) [] Diarrhea 
[] Constipation (see above including last bowel movement) [] Checked for impaction 
[x] Last BM none charted since admit Nutrition Diet:NPO Appetite:  
[] Good  
[] Fair  
[] Poor  
[] Tube Feeding  
[] Voiding 
[] Incontinent [x] Elizabeth Musculoskeletal 
[] Balance/[place] Unsteady  
[] Weak Strength:  
 [] Normal  
 [] Limited [x] Decreasing Activities:  
 [] Up as tolerated 
 [x] Bedridden  
 [] Specify: 
 
SAFETY [] 24 hr. Caregiver [x] Side rails ? [x] Hospital bed  
[] Reviewed Falls & Safety ALLERGIES AND MEDICATIONS Allergies: Allergies Allergen Reactions  Nsaids (Non-Steroidal Anti-Inflammatory Drug) Unknown (comments)  Penicillins Unknown (comments)  Sulfa (Sulfonamide Antibiotics) Unknown (comments)  Sulfasalazine Unknown (comments) Current Facility-Administered Medications Medication Dose Route Frequency  ketorolac (TORADOL) injection 15 mg  15 mg IntraVENous Q8H PRN  
 glycopyrrolate (ROBINUL) injection 0.2 mg  0.2 mg IntraVENous Q4H PRN  
 bisacodyL (DULCOLAX) suppository 10 mg  10 mg Rectal DAILY PRN  
 HYDROmorphone (PF) (DILAUDID) injection 0.5 mg  0.5 mg IntraVENous Q15MIN PRN  
 HYDROmorphone (PF) (DILAUDID) injection 0.5 mg  0.5 mg IntraVENous Q4H  
 LORazepam (ATIVAN) injection 0.5 mg  0.5 mg IntraVENous Q15MIN PRN  
 LORazepam (ATIVAN) injection 0.5 mg  0.5 mg IntraVENous Q4H  
 sodium chloride (NS) flush 5 mL  5 mL IntraVENous PRN Visit Time In: 1288 Visit Time Out: 1100

## 2021-01-27 NOTE — PROGRESS NOTES
End of Shift Note Bedside shift change report given to [facility] (oncoming nurse) by Genoveva Jean-Baptiste (offgoing nurse). Report included the following information SBAR, Kardex and Recent Results Shift worked:  7p-7a Shift summary and any significant changes:  
  Pt rested quietly through night. Concerns for physician to address:   
  
Zone phone for [place] shift:   [phone] Activity: 
Activity Level: Bed Rest 
Number times ambulated in hallways past shift: 0 Number of times OOB to chair past shift: 0 Cardiac:  
Cardiac Monitoring: No     
  
 
Access:  
Current line(s): PIV Genitourinary:  
Urinary status: saravia Respiratory:  
  
Chronic home O2 use?: NO Incentive spirometer at bedside: NO 
  
GI: 
  
Current diet:  No diet orders on file Passing flatus: NO Tolerating current diet: NO 
  
 
Pain Management:  
Patient states pain is manageable on current regimen: N/A Skin: 
Jerson Score: 11 Interventions: speciality bed and float heels Patient Safety: 
Fall Score: Total Score: 2 Interventions: bed/chair alarm High Fall Risk: Yes Length of Stay: 
Expected LOS: - - - Actual LOS: 2 Genoveva Jean-Baptiste

## 2021-01-28 NOTE — PROGRESS NOTES
End of Shift Note Bedside shift change report given to Leander Waters (oncoming nurse) by John Paul Peña (offgoing nurse). Report included the following information SBAR, Kardex, Intake/Output, MAR, Accordion and Recent Results Shift worked:  7-7p Shift summary and any significant changes:  
  comfort meds given Concerns for physician to address:  none Zone phone for oncoming shift:   [phone]

## 2021-01-28 NOTE — HOSPICE
[facility] Good Help to Those in Need 
[phone] Discharge/Death Nursing Note Patient Name: Alejo Garcia YOB: 1929 Age: 80 y.o. Date of Death: 21 Admitted Date: 2021 Time of Death: 1024 Facility of Care: [facility] Level of Care: [facility] Patient Room: [address] [place] [address] Hospice Attending: Benjamín Sesay MD 
Hospice Diagnosis: Respiratory failure [facility]) [J96.90] Death Pronouncement Pronouncement of death completed by: Daniel Martin RN  Under the service of Dr. Vani Long, Hospice Medical Director Agency staff was present at the time of death At the time of death the patient was documented as Patient with no breath sounds, no heart sounds, no pulse, no response to stimuli, pupils fixed. Patient is  at 1 The pt  within [address] The following were notified of the patient's death: sister Tomasz Gatica RN supervisor, Guera Dasilva MD, Hospice Medications were disposed of per facility protocol Discharge Summary Discharge Reason: Death Summary of Care Provided: 
 
[x] Post mortem care provided by RN Staff [x] Notification of  home by RN Supervisor 
[] Referrals/Community resources provided:  
[] Goals completed 
[] Durable Medical Equipment vendor notified Disciplines involved: [x] RN [x] SW [x]  [] RABAGO [] Vol [] PT [] OT [] ST [] BC 
 
[x] IDT communication/notification Attending Physician, Dr. Abraham Velázquez, notified of death Bereaved Verify bereaved identified with name, address, telephone number and risk level Advance Care Planning 2021 Patient's Healthcare Decision Maker is: Legal Next of Kin Confirm Advance Directive Yes, not on file Kat [place] [phone] Found and [address] [place] [address]

## 2021-01-28 NOTE — [facility] IDG OTHER NOTES
HOSPICE -- BEREAVEMENT -- IDG NOTE On 1/28/2021 this bereavement counselor confirmed bereavement information with the primary care team.  Sister Inga Osgood is designated as the primary bereaved at medium risk due to of multiple stressors:  Factors surrounding patient's COVID+ diagnosis (such as not seeing patient while patient was at [address]); close relationship with patient (considers patient like a mother); and another sister who has dementia.

## 2021-01-28 NOTE — HOSPICE
Patient with no breath sounds, no heart sounds, no pulse, no response to stimuli, pupils fixed. Patient is  at 1. This RN pronounced patient at 1024 under the service of Dr. Stevie Garcia, Hospice Medical Director. Thank you,  
Elda Cho RN [employer] [phone] Mobile

## 2021-01-28 NOTE — [facility] IDG MASTER NOTE
[address] [facility] Date: 01/28/21 Time: 12:18 PM 
 
___________________ Patient: Colon Helm Coverage Information: 
   Payor: Garrick Wallace Plan: [place] PART A & B Subscriber ID: [id number] Phone Number:  
 
   Payor: [facility] MEDICAID [facility]: [facility] ID: [id number] Phone Number: MRN: [id number] Hospice Election Date: 1/25/2021 Current Benefit Period: Benefit Period 1 Start Date: 1/25/2021 End Date: 4/24/2021 Medical Director: R[facility] Val[facility] Attending Provider: Mateo Stewart MD 
[address] [id number] Phone: [phone] Fax: [phone] Level of Care: [facility] 
 
 
___________________ Diagnoses: 
Diagnoses of Shortness of breath, Acute respiratory failure with hypoxia ([facility] [address] Restlessness and agitation, Hospice care patient, and Pneumonia due to COVID-19 virus were pertinent to this visit. Current Medications: 
 
Current Facility-Administered Medications:  
  ketorolac (TORADOL) injection 15 mg, 15 mg, IntraVENous, NOW, Tim Molina MD, Stopped at 01/28/21 1000 
  HYDROmorphone (PF) (DILAUDID) injection 1 mg, 1 mg, IntraVENous, E3W, Tim Molina MD, 1 mg at 01/28/21 1635   LORazepam (ATIVAN) injection 1 mg, 1 mg, IntraVENous, T6J, Mateo Stewart MD, 1 mg at 01/28/21 0851 
  HYDROmorphone (PF) (DILAUDID) injection 1 mg, 1 mg, IntraVENous, [id number] PRN, Tim Molina MD 
  LORazepam (ATIVAN) injection 1 mg, 1 mg, IntraVENous, B42BME PRN, Tim Molina MD 
  glycopyrrolate (ROBINUL) injection 0.2 mg, 0.2 mg, IntraVENous, P6U, Tim Molina MD, 0.2 mg at 01/28/21 1638   bisacodyL (DULCOLAX) suppository 10 mg, 10 mg, Rectal, DAILY PRN, Tim Molina MD 
  sodium chloride (NS) flush 5 mL, 5 mL, IntraVENous, PRN, Mateo Stewart MD 
 
Orders: 
Orders Placed This Encounter  COMFORT MEASURES ONLY Standing Status:   Standing Number of Occurrences:   1 Joshua Alaniz 53 Once daily and as needed for symptom assessment Standing Status:   Standing Number of Occurrences:   93081  BEDREST, COMPLETE Standing Status:   Standing Number of Occurrences:   1  
 ORAL CARE Keep mouth moisturized with sponge sticks/toothettes and tap water. Vaseline to lips and nares as needed. Standing Status:   Standing Number of Occurrences:   1  
 NOTIFY PROVIDER: SPECIFY NOTIFY PROVIDER: FOR PAIN, DYSPNEA, AGITATION, OTHER DISTRESS OR NOT RESPONDING TO ORDERED INTERVENTIONS ONE TIME Routine Standing Status:   Standing Number of Occurrences:   1 Order Specific Question:   Please describe the test or procedure you would like to order. Answer:   NOTIFY PROVIDER: FOR PAIN, DYSPNEA, AGITATION, OTHER DISTRESS OR NOT RESPONDING TO ORDERED INTERVENTIONS  
 NURSING-MISCELLANEOUS: Admit to [facility] level of care; SN visit daily X 7 with 5 visits PRN symptom control; SW visit 1 X weekly and 5 visits PRN family support,  visit 1 X weekly and 5 visits PRN spiritual support. CONTINUOUS Standing Status:   Standing Number of Occurrences:   1 Order Specific Question:   Description of Order: Answer:   Admit to [facility] level of care; SN visit daily X 7 with 5 visits PRN symptom control; SW visit 1 X weekly and 5 visits PRN family support,  visit 1 X weekly and 5 visits PRN spiritual support.  NURSING-MISCELLANEOUS: (see comments) 1. NO admission labs, x-rays or other diagnostic tests, unless pertinent to symptom control . 2. Discontinue ALL prior medications. CONTINUOUS 1. NO admission labs, x-rays or other diagnostic tests, unless pertinent to symptom control . 2. Discontinue ALL prior medications. Standing Status:   Standing Number of Occurrences:   1 Order Specific Question:   Description of Order: Answer:   (see comments)  REASONS TO CONTINUE HINKLE CATHETER DUE TO Standing Status:   Standing Number of Occurrences:   1 Order Specific Question:   REASONS:  
  Answer:   End of Life Care  DO NOT RESUSCITATE Standing Status:   Standing Number of Occurrences:   1  Droplet Plus Isolation Standing Status:   Standing Number of Occurrences:   1  
 OXYGEN CANNULA Liters per minute: 2; Indications for O2 therapy: OTHER, RESPIRATORY DISTRESS CONTINUOUS Routine Oxygen as needed. Adjust for comfort. Discontinue if not contributing to patient comfort. Standing Status:   Standing Number of Occurrences:   1 Order Specific Question:   Liters per minute: Answer:   2 Order Specific Question:   Indications for O2 therapy Answer:   OTHER Order Specific Question:   Indications for O2 therapy Answer:   RESPIRATORY DISTRESS  RT--OXIMETRY, SPOT CHECK Standing Status:   Standing Number of Occurrences:   33100  GLUCOSE, POC Standing Status:   Standing Number of Occurrences:   1  ketorolac (TORADOL) injection 15 mg  
 DISCONTD: glycopyrrolate (ROBINUL) injection 0.2 mg  
 bisacodyL (DULCOLAX) suppository 10 mg  
 DISCONTD: HYDROmorphone (PF) (DILAUDID) injection 0.5 mg  
 DISCONTD: LORazepam (ATIVAN) injection 0.5 mg  
 DISCONTD: LORazepam (ATIVAN) injection 0.5 mg  
 sodium chloride (NS) flush 5 mL  
 HYDROmorphone (PF) (DILAUDID) injection 1 mg  LORazepam (ATIVAN) injection 1 mg  
 HYDROmorphone (PF) (DILAUDID) injection 1 mg  LORazepam (ATIVAN) injection 1 mg  
 glycopyrrolate (ROBINUL) injection 0.2 mg  
 ketorolac (TORADOL) injection 15 mg  
 INITIAL PHYSICIAN ORDER: INPATIENT Inpatient Hospice; 3. Patient receiving treatment that can only be provided in an inpatient setting (further clarification in H&P documentation) Standing Status:   Standing Number of Occurrences:   1 Order Specific Question:   Status: Answer:   Robbie Hoskins Order Specific Question:   Type of Bed Answer:   Inpatient Hospice [5] Order Specific Question:   Inpatient Hospitalization Certified Necessary for the Following Reasons Answer:   3. Patient receiving treatment that can only be provided in an inpatient setting (further clarification in H&P documentation) Order Specific Question:   Admitting Diagnosis Answer:   Respiratory failure ([address] [id number] Order Specific Question:   Admitting Physician Corazon Malik [[id number]] Order Specific Question:   Attending Physician Corazon Malik [[id number]] Order Specific Question:   Estimated Length of Stay Answer:   3-4 Midnights Order Specific Question:   Discharge Plan: Answer:   Extended Care Facility (e.g. Adult Home, Nursing Home, etc.) Comments:   returnt to [facility] fhsould pt stabilize Allergies: Allergies Allergen Reactions  Nsaids (Non-Steroidal Anti-Inflammatory Drug) Unknown (comments)  Penicillins Unknown (comments)  Sulfa (Sulfonamide Antibiotics) Unknown (comments)  Sulfasalazine Unknown (comments) Care Plan: 
Multidisciplinary Problems (Active) Problem: Anticipatory Grief Dates: Start: 01/25/21 Description: Deficit related to anticipatory grief. Disciplines: Interdisciplinary Goal: Grief heard and acknowledged, anxiety reduced, patient coping identified, patient/family expressed gratitude Dates: Start: 01/25/21 Priority: Medium Description: Jim Gutierrez will have grief support in processing grief and relational loss within 5 days. Disciplines: Interdisciplinary Intervention: Assist with grief counseling Dates: Start: 01/25/21 Description: LCSW will provide grief support for sister Jim Gutierrez in processing grief and relational loss. Problem: Emotional Support Needs Dates: Start: 01/25/21 Description: Deficit related to emotional support. Disciplines: Interdisciplinary Goal: Patient/family is receiving emotional support Dates: Start: 01/25/21 Priority: High Description: Preston Berman will have emotional support and supportive counseling in coping with pts imminent death due to respiratory failure in the setting of COVID 19 within 5 days. Disciplines: Interdisciplinary Intervention: Provide emotional support Dates: Start: 01/25/21 Description: LCSW will provided pts sister Preston Berman with emotional support and supportive counseling in coping with pts imminent death due to respiratory failure in the setting of COVID 19. Problem: Falls - Risk of   
 Dates: Start: 01/25/21 Disciplines: Interdisciplinary Goal: *Absence of Falls Dates: Start: 01/25/21 Description: Document Mary Ann Stone Fall Risk and appropriate interventions in the flowsheet. Disciplines: Interdisciplinary Problem: Patient Education: Go to Patient Education Activity Dates: Start: 01/25/21 Disciplines: Interdisciplinary Goal: Patient/Family Education Dates: Start: 01/25/21 Disciplines: Interdisciplinary Problem: Patient Education: Go to Patient Education Activity Dates: Start: 01/25/21 Disciplines: Interdisciplinary Goal: Patient/Family Education Dates: Start: 01/25/21 Disciplines: Interdisciplinary Problem: Pressure Injury - Risk of   
 Dates: Start: 01/25/21 Disciplines: Interdisciplinary Goal: *Prevention of pressure injury Dates: Start: 01/25/21 Description: Document Jerson Scale and appropriate interventions in the flowsheet. Disciplines: Interdisciplinary Care Plan Problems/Goals Progressing Towards Goal (4) Grief heard and acknowledged, anxiety reduced, patient coping identified, patient/family expressed gratitude (Anticipatory Grief) Disciplines:  Interdisciplinary Expected end:  - Outcome: Progressing Towards Goal By Gilbert Tirado on 01/27/21 1430 Patient/Family Education (Patient Education: Go to Patient Education Activity) Disciplines:  Interdisciplinary Expected end:  - Outcome: Progressing Towards Goal By Ko Ac on 01/26/21 1020 *Prevention of pressure injury (Pressure Injury - Risk of) Disciplines:  Interdisciplinary Expected end:  - Outcome: Progressing Towards Goal By Sedrick Estrada RN on 01/25/21 2049 Patient/Family Education (Patient Education: Go to Patient Education Activity) Disciplines:  Interdisciplinary Expected end:  - Outcome: Progressing Towards Goal By Ko Draft on 01/27/21 1020 No Outcome (2) Patient/family is receiving emotional support (Emotional Support Needs) Disciplines:  Interdisciplinary Expected end:  -   
  
 
 *Absence of Falls (Falls - Risk of) Disciplines:  Interdisciplinary Expected end:  -   
  
 
  
  
  
  
  
 
 
___________________ Care Team Notes POC/IDG Notes [facility] IDG Other Notes by rFank Vergara LCSW at 01/28/21 4268  Version 1 of 1 Author: Frank Vergara LCSW Service: Hospice and Palliative Care Author Type:  Filed: 01/28/21 1026 Date of Service: 01/28/21 0986 Status: Signed : Frank Vergara LCSW () HOSPICE -- BEREAVEMENT -- IDG NOTE On 1/28/2021 this bereavement counselor confirmed bereavement information with the primary care team.  Sister Glendy Rodriguez is designated as the primary bereaved at medium risk due to of multiple stressors:  Factors surrounding patient's COVID+ diagnosis (such as not seeing patient while patient was at [address]); close relationship with patient (considers patient like a mother); and another sister who has dementia. [facility] [place] IDG  Notes by Soila Liriano at 01/28/21 0927  Version 1 of 1 Author: Esau Yeung Service: Hospice and Palliative Care Author Type: Pastoral Care Filed: 01/28/21 0929 Date of Service: 01/28/21 2648 Status: Signed : Esau Yeung [facility]) Patient: Angela Woods Date: 01/28/21 Time: 9:27 AM 
 
[facility] [place]  Notes Initial assessment completed by chaplain Lawrence Mosqueda. Unable to visit patient due to [facility] hospital protocols. Contacted patient's sister to offer support. Interventions:  Completed spiritual assessment with patient's sister. Offered grief support. Offered prayer. Goal for next 2 weeks:  Continue to provide grief support and prayer with patient's sister. Signed by: Ajay NGUYEN [facility] IDG  Notes by Halie Loyd at 01/25/21 1643  Version 1 of 1 Author: Halie Loyd Service: Hospice and Palliative Care Author Type:  Filed: 01/25/21 164 Date of Service: 01/25/21 1643 Status: Signed : Halie Loyd () This LCSW met with pts sister and Tricia Beth for initial assessment. Pt has a hospice diagnosis of respiratory failure. Pt has multiple comorbidites including COVID 19, PNA r/t COVID 1, acute metabolic encephalopathy, and mild dementia. Pt was admitted 1/19/2021 from [facility] d/t hypoxia. LCSW met with pts sister outside pts room due to contact restrictions due to [place]. LCSW will provide emotional support and supportive counseling to sister Diana Henriquez in coping with pts imminent death due to respiratory failure in the setting of COVID 19. LCSW will provide grief support for sister Diana Henriquez in processing grief and relational loss Moderate risk for BR due to COVID 19, and not being able to see pt due to COIVD 19 restrictions at [facility]. Admitted GIP for Acute Hypoxic respieratory failure hav.ing agitation and resp distress scheduled robinul and increased dilaudid and ativan to 1mg. BP low and sats low having an increased temp and HR Care Team Present:  
Dr. Gerry Waters MSW Ayleen Nunez MSW \"Amina Coffey MSW Karen Dejesus Bereavement

## 2021-01-28 NOTE — PROGRESS NOTES
End of Shift Note Bedside shift change report given to Enmanuel Pop (oncoming nurse) by Sarita Lopez (offgoing nurse). Report included the following information SBAR, Kardex, Intake/Output, MAR, Accordion and Recent Results Shift worked:  [id number] Shift summary and any significant changes:  
  Comfort meds given Concerns for physician to address:  n/a 
  
Zone phone for oncoming shift:   [phone] Activity: 
Activity Level: Bed Rest 
Number times ambulated in hallways past shift: 0 Number of times OOB to chair past shift: 0 Cardiac:  
Cardiac Monitoring: No     
  
 
Access:  
Current line(s): PIV Genitourinary:  
Urinary status: saravia Respiratory:  
O2 Device: Nasal cannula Chronic home O2 use?: NO Incentive spirometer at bedside: NO 
  
GI: 
Last Bowel Movement Date: (unable to assess) Current diet:  No diet orders on file Passing flatus: NO Tolerating current diet: NO 
  
 
Pain Management:  
Patient states pain is manageable on current regimen: N/A Skin: 
Jerson Score: 10 Interventions: internal/external urinary devices Patient Safety: 
Fall Score: Total Score: 2 Interventions: bed/chair alarm High Fall Risk: Yes Length of Stay: 
Expected LOS: - - - Actual LOS: 3 Sarita Lopez

## 2021-01-28 NOTE — PROGRESS NOTES
RN Fabienne Pride notified patient in [phone] while  was checking with another patient in the Med Tele unit. Per Hospice RN, patient's family is living out of town, would not come to see her.  called on the phone to provide support to her registered family Rona Tian the line was busy.  will make another attempt to support the family. [id number] [employer] Mukund Duncan., M.S., Th.M. 
Spiritual Care Provider  Paging Service [phone])

## 2021-01-28 NOTE — [facility] IDG CHAPLAIN NOTES
Patient: Ambika Winkler Date: 01/28/21 Time: 9:27 AM 
 
[facility]  Notes Initial assessment completed by  Goldie Wright. Unable to visit patient due to [facility] hospital protocols. Contacted patient's sister to offer support. Interventions:  Completed spiritual assessment with patient's sister. Offered grief support. Offered prayer. Goal for next 2 weeks:  Continue to provide grief support and prayer with patient's sister. Signed by: Daryle Forester

## 2021-01-28 NOTE — DISCHARGE SUMMARY
Hospice Discharge Summary [employer] Good Help to Those in Need Date of Admission: 1/25/2021 Date of Discharge: 1/28/21 Dianna Medellin is a 80y.o. year old who was admitted to [facility] at [id number] [place] [address] with a Hospice diagnosis of [facility] [J96.90]. The patient's care was focused on comfort and the patient passed away on 1/28/21

## 2021-01-29 NOTE — HOSPICE
[facility] LCSW note: This LCSW called pts sister Mia Campbell to offer condolences and support. LCSW and Mia Campbell discussed her coping. Mia Campbell reports \"taking care of things\" is helping her to cope with pts death. LCSW provided affirmation of Micheline's coping.

## 2021-01-30 LAB
BACTERIA SPEC CULT: NORMAL
SERVICE CMNT-IMP: NORMAL
